# Patient Record
Sex: MALE | Race: WHITE | NOT HISPANIC OR LATINO | Employment: OTHER | URBAN - METROPOLITAN AREA
[De-identification: names, ages, dates, MRNs, and addresses within clinical notes are randomized per-mention and may not be internally consistent; named-entity substitution may affect disease eponyms.]

---

## 2018-02-23 ENCOUNTER — TRANSFERRED RECORDS (OUTPATIENT)
Dept: HEALTH INFORMATION MANAGEMENT | Facility: CLINIC | Age: 83
End: 2018-02-23

## 2023-01-01 ENCOUNTER — MEDICAL CORRESPONDENCE (OUTPATIENT)
Dept: HEALTH INFORMATION MANAGEMENT | Facility: CLINIC | Age: 88
End: 2023-01-01
Payer: OTHER MISCELLANEOUS

## 2023-01-01 ENCOUNTER — APPOINTMENT (OUTPATIENT)
Dept: GENERAL RADIOLOGY | Facility: CLINIC | Age: 88
DRG: 872 | End: 2023-01-01
Attending: EMERGENCY MEDICINE
Payer: MEDICARE

## 2023-01-01 ENCOUNTER — HOSPITAL ENCOUNTER (INPATIENT)
Facility: CLINIC | Age: 88
LOS: 4 days | End: 2023-05-05
Attending: HOSPITALIST | Admitting: INTERNAL MEDICINE
Payer: OTHER MISCELLANEOUS

## 2023-01-01 ENCOUNTER — APPOINTMENT (OUTPATIENT)
Dept: CT IMAGING | Facility: CLINIC | Age: 88
DRG: 872 | End: 2023-01-01
Attending: EMERGENCY MEDICINE
Payer: MEDICARE

## 2023-01-01 ENCOUNTER — HOSPITAL ENCOUNTER (INPATIENT)
Facility: CLINIC | Age: 88
LOS: 2 days | Discharge: HOSPICE | DRG: 872 | End: 2023-05-01
Attending: EMERGENCY MEDICINE | Admitting: HOSPITALIST
Payer: MEDICARE

## 2023-01-01 ENCOUNTER — MEDICAL CORRESPONDENCE (OUTPATIENT)
Dept: HEALTH INFORMATION MANAGEMENT | Facility: CLINIC | Age: 88
End: 2023-01-01

## 2023-01-01 VITALS
OXYGEN SATURATION: 94 % | HEART RATE: 100 BPM | BODY MASS INDEX: 25.26 KG/M2 | HEIGHT: 67 IN | SYSTOLIC BLOOD PRESSURE: 132 MMHG | WEIGHT: 160.94 LBS | TEMPERATURE: 102.1 F | DIASTOLIC BLOOD PRESSURE: 82 MMHG | RESPIRATION RATE: 16 BRPM

## 2023-01-01 VITALS — TEMPERATURE: 101.1 F | RESPIRATION RATE: 9 BRPM

## 2023-01-01 DIAGNOSIS — Z51.5 HOSPICE CARE PATIENT: ICD-10-CM

## 2023-01-01 DIAGNOSIS — A41.9 SEPSIS, DUE TO UNSPECIFIED ORGANISM, UNSPECIFIED WHETHER ACUTE ORGAN DYSFUNCTION PRESENT (H): ICD-10-CM

## 2023-01-01 DIAGNOSIS — N39.0 URINARY TRACT INFECTION WITHOUT HEMATURIA, SITE UNSPECIFIED: ICD-10-CM

## 2023-01-01 LAB
ACINETOBACTER SPECIES: NOT DETECTED
ALBUMIN SERPL BCG-MCNC: 3.6 G/DL (ref 3.5–5.2)
ALBUMIN UR-MCNC: 100 MG/DL
ALP SERPL-CCNC: 193 U/L (ref 40–129)
ALT SERPL W P-5'-P-CCNC: 19 U/L (ref 10–50)
ANION GAP SERPL CALCULATED.3IONS-SCNC: 14 MMOL/L (ref 7–15)
APPEARANCE UR: ABNORMAL
AST SERPL W P-5'-P-CCNC: 27 U/L (ref 10–50)
ATRIAL RATE - MUSE: NORMAL BPM
BACTERIA BLD CULT: ABNORMAL
BACTERIA UR CULT: ABNORMAL
BASOPHILS # BLD AUTO: 0 10E3/UL (ref 0–0.2)
BASOPHILS NFR BLD AUTO: 0 %
BILIRUB SERPL-MCNC: 0.4 MG/DL
BILIRUB UR QL STRIP: NEGATIVE
BUN SERPL-MCNC: 43.2 MG/DL (ref 8–23)
CALCIUM SERPL-MCNC: 9.1 MG/DL (ref 8.2–9.6)
CHLORIDE SERPL-SCNC: 105 MMOL/L (ref 98–107)
CITROBACTER SPECIES: NOT DETECTED
COLOR UR AUTO: ABNORMAL
CREAT SERPL-MCNC: 1.47 MG/DL (ref 0.67–1.17)
CTX-M: NOT DETECTED
DEPRECATED HCO3 PLAS-SCNC: 19 MMOL/L (ref 22–29)
DIASTOLIC BLOOD PRESSURE - MUSE: NORMAL MMHG
ENTEROBACTER SPECIES: NOT DETECTED
EOSINOPHIL # BLD AUTO: 0 10E3/UL (ref 0–0.7)
EOSINOPHIL NFR BLD AUTO: 0 %
ERYTHROCYTE [DISTWIDTH] IN BLOOD BY AUTOMATED COUNT: 15.3 % (ref 10–15)
ESCHERICHIA COLI: DETECTED
FLUAV RNA SPEC QL NAA+PROBE: NEGATIVE
FLUBV RNA RESP QL NAA+PROBE: NEGATIVE
GFR SERPL CREATININE-BSD FRML MDRD: 44 ML/MIN/1.73M2
GLUCOSE SERPL-MCNC: 132 MG/DL (ref 70–99)
GLUCOSE UR STRIP-MCNC: NEGATIVE MG/DL
HCO3 BLDV-SCNC: 20 MMOL/L (ref 21–28)
HCT VFR BLD AUTO: 31.5 % (ref 40–53)
HGB BLD-MCNC: 10.3 G/DL (ref 13.3–17.7)
HGB UR QL STRIP: ABNORMAL
HOLD SPECIMEN: NORMAL
IMM GRANULOCYTES # BLD: 0.2 10E3/UL
IMM GRANULOCYTES NFR BLD: 1 %
IMP: NOT DETECTED
INTERPRETATION ECG - MUSE: NORMAL
KETONES UR STRIP-MCNC: NEGATIVE MG/DL
KLEBSIELLA OXYTOCA: NOT DETECTED
KLEBSIELLA PNEUMONIAE: NOT DETECTED
KPC: NOT DETECTED
LACTATE BLD-SCNC: 1.3 MMOL/L
LACTATE SERPL-SCNC: 1.4 MMOL/L (ref 0.7–2)
LEUKOCYTE ESTERASE UR QL STRIP: ABNORMAL
LYMPHOCYTES # BLD AUTO: 1 10E3/UL (ref 0.8–5.3)
LYMPHOCYTES NFR BLD AUTO: 5 %
MCH RBC QN AUTO: 32 PG (ref 26.5–33)
MCHC RBC AUTO-ENTMCNC: 32.7 G/DL (ref 31.5–36.5)
MCV RBC AUTO: 98 FL (ref 78–100)
MONOCYTES # BLD AUTO: 1.2 10E3/UL (ref 0–1.3)
MONOCYTES NFR BLD AUTO: 7 %
MUCOUS THREADS #/AREA URNS LPF: PRESENT /LPF
NDM: NOT DETECTED
NEUTROPHILS # BLD AUTO: 15.3 10E3/UL (ref 1.6–8.3)
NEUTROPHILS NFR BLD AUTO: 87 %
NITRATE UR QL: POSITIVE
NRBC # BLD AUTO: 0 10E3/UL
NRBC BLD AUTO-RTO: 0 /100
OXA (DETECTED/NOT DETECTED): NOT DETECTED
P AXIS - MUSE: NORMAL DEGREES
PCO2 BLDV: 37 MM HG (ref 40–50)
PH BLDV: 7.35 [PH] (ref 7.32–7.43)
PH UR STRIP: 5.5 [PH] (ref 5–7)
PLATELET # BLD AUTO: 228 10E3/UL (ref 150–450)
PO2 BLDV: 30 MM HG (ref 25–47)
POTASSIUM SERPL-SCNC: 4.5 MMOL/L (ref 3.4–5.3)
PR INTERVAL - MUSE: NORMAL MS
PROT SERPL-MCNC: 7.7 G/DL (ref 6.4–8.3)
PROTEUS SPECIES: NOT DETECTED
PSEUDOMONAS AERUGINOSA: NOT DETECTED
QRS DURATION - MUSE: 78 MS
QT - MUSE: 320 MS
QTC - MUSE: 412 MS
R AXIS - MUSE: 56 DEGREES
RBC # BLD AUTO: 3.22 10E6/UL (ref 4.4–5.9)
RBC URINE: 4 /HPF
RSV RNA SPEC NAA+PROBE: NEGATIVE
SAO2 % BLDV: 54 % (ref 94–100)
SARS-COV-2 RNA RESP QL NAA+PROBE: NEGATIVE
SODIUM SERPL-SCNC: 138 MMOL/L (ref 136–145)
SP GR UR STRIP: 1.01 (ref 1–1.03)
SYSTOLIC BLOOD PRESSURE - MUSE: NORMAL MMHG
T AXIS - MUSE: 70 DEGREES
TROPONIN T SERPL HS-MCNC: 77 NG/L
UROBILINOGEN UR STRIP-MCNC: NORMAL MG/DL
VENTRICULAR RATE- MUSE: 100 BPM
VIM: NOT DETECTED
WBC # BLD AUTO: 17.6 10E3/UL (ref 4–11)
WBC CLUMPS #/AREA URNS HPF: PRESENT /HPF
WBC URINE: >182 /HPF

## 2023-01-01 PROCEDURE — 99232 SBSQ HOSP IP/OBS MODERATE 35: CPT | Mod: GV | Performed by: INTERNAL MEDICINE

## 2023-01-01 PROCEDURE — 250N000013 HC RX MED GY IP 250 OP 250 PS 637: Performed by: INTERNAL MEDICINE

## 2023-01-01 PROCEDURE — 110N000005 HC R&B HOSPICE, ACCENT

## 2023-01-01 PROCEDURE — 83605 ASSAY OF LACTIC ACID: CPT | Performed by: EMERGENCY MEDICINE

## 2023-01-01 PROCEDURE — 81001 URINALYSIS AUTO W/SCOPE: CPT | Performed by: EMERGENCY MEDICINE

## 2023-01-01 PROCEDURE — 80053 COMPREHEN METABOLIC PANEL: CPT | Performed by: EMERGENCY MEDICINE

## 2023-01-01 PROCEDURE — 250N000009 HC RX 250: Performed by: INTERNAL MEDICINE

## 2023-01-01 PROCEDURE — 250N000011 HC RX IP 250 OP 636: Performed by: HOSPITALIST

## 2023-01-01 PROCEDURE — 99285 EMERGENCY DEPT VISIT HI MDM: CPT | Mod: 25,CS

## 2023-01-01 PROCEDURE — 87086 URINE CULTURE/COLONY COUNT: CPT | Performed by: EMERGENCY MEDICINE

## 2023-01-01 PROCEDURE — 250N000011 HC RX IP 250 OP 636: Performed by: INTERNAL MEDICINE

## 2023-01-01 PROCEDURE — 120N000001 HC R&B MED SURG/OB

## 2023-01-01 PROCEDURE — C9803 HOPD COVID-19 SPEC COLLECT: HCPCS

## 2023-01-01 PROCEDURE — 70450 CT HEAD/BRAIN W/O DYE: CPT

## 2023-01-01 PROCEDURE — 82803 BLOOD GASES ANY COMBINATION: CPT

## 2023-01-01 PROCEDURE — 85025 COMPLETE CBC W/AUTO DIFF WBC: CPT | Performed by: EMERGENCY MEDICINE

## 2023-01-01 PROCEDURE — 250N000013 HC RX MED GY IP 250 OP 250 PS 637: Performed by: HOSPITALIST

## 2023-01-01 PROCEDURE — 36415 COLL VENOUS BLD VENIPUNCTURE: CPT | Performed by: EMERGENCY MEDICINE

## 2023-01-01 PROCEDURE — 96375 TX/PRO/DX INJ NEW DRUG ADDON: CPT

## 2023-01-01 PROCEDURE — 87637 SARSCOV2&INF A&B&RSV AMP PRB: CPT | Performed by: EMERGENCY MEDICINE

## 2023-01-01 PROCEDURE — 71046 X-RAY EXAM CHEST 2 VIEWS: CPT

## 2023-01-01 PROCEDURE — 96365 THER/PROPH/DIAG IV INF INIT: CPT

## 2023-01-01 PROCEDURE — 99207 PR APP CREDIT; MD BILLING SHARED VISIT: CPT | Mod: GV | Performed by: INTERNAL MEDICINE

## 2023-01-01 PROCEDURE — 258N000003 HC RX IP 258 OP 636: Performed by: EMERGENCY MEDICINE

## 2023-01-01 PROCEDURE — 250N000011 HC RX IP 250 OP 636: Performed by: EMERGENCY MEDICINE

## 2023-01-01 PROCEDURE — 99239 HOSP IP/OBS DSCHRG MGMT >30: CPT | Mod: GV | Performed by: INTERNAL MEDICINE

## 2023-01-01 PROCEDURE — 83605 ASSAY OF LACTIC ACID: CPT

## 2023-01-01 PROCEDURE — G0378 HOSPITAL OBSERVATION PER HR: HCPCS

## 2023-01-01 PROCEDURE — 84484 ASSAY OF TROPONIN QUANT: CPT | Performed by: EMERGENCY MEDICINE

## 2023-01-01 PROCEDURE — 99222 1ST HOSP IP/OBS MODERATE 55: CPT | Performed by: HOSPITALIST

## 2023-01-01 PROCEDURE — 250N000013 HC RX MED GY IP 250 OP 250 PS 637: Performed by: EMERGENCY MEDICINE

## 2023-01-01 PROCEDURE — 93005 ELECTROCARDIOGRAM TRACING: CPT | Mod: 76

## 2023-01-01 PROCEDURE — 87077 CULTURE AEROBIC IDENTIFY: CPT | Performed by: EMERGENCY MEDICINE

## 2023-01-01 PROCEDURE — 99231 SBSQ HOSP IP/OBS SF/LOW 25: CPT | Mod: GV | Performed by: INTERNAL MEDICINE

## 2023-01-01 PROCEDURE — 99238 HOSP IP/OBS DSCHRG MGMT 30/<: CPT | Mod: GV | Performed by: NURSE PRACTITIONER

## 2023-01-01 PROCEDURE — 87149 DNA/RNA DIRECT PROBE: CPT | Performed by: EMERGENCY MEDICINE

## 2023-01-01 PROCEDURE — 99207 PR NO BILLABLE SERVICE THIS VISIT: CPT | Mod: GV | Performed by: INTERNAL MEDICINE

## 2023-01-01 PROCEDURE — 96361 HYDRATE IV INFUSION ADD-ON: CPT

## 2023-01-01 PROCEDURE — 99232 SBSQ HOSP IP/OBS MODERATE 35: CPT | Performed by: INTERNAL MEDICINE

## 2023-01-01 RX ORDER — MORPHINE SULFATE 20 MG/ML
10 SOLUTION ORAL
Status: CANCELLED | OUTPATIENT
Start: 2023-01-01

## 2023-01-01 RX ORDER — MORPHINE SULFATE 2 MG/ML
1 INJECTION, SOLUTION INTRAMUSCULAR; INTRAVENOUS
Status: DISCONTINUED | OUTPATIENT
Start: 2023-01-01 | End: 2023-01-01 | Stop reason: HOSPADM

## 2023-01-01 RX ORDER — ACETAMINOPHEN 325 MG/1
650 TABLET ORAL EVERY 6 HOURS PRN
Status: DISCONTINUED | OUTPATIENT
Start: 2023-01-01 | End: 2023-01-01 | Stop reason: HOSPADM

## 2023-01-01 RX ORDER — CEFTRIAXONE 2 G/1
2 INJECTION, POWDER, FOR SOLUTION INTRAMUSCULAR; INTRAVENOUS ONCE
Status: COMPLETED | OUTPATIENT
Start: 2023-01-01 | End: 2023-01-01

## 2023-01-01 RX ORDER — NALOXONE HYDROCHLORIDE 0.4 MG/ML
0.1 INJECTION, SOLUTION INTRAMUSCULAR; INTRAVENOUS; SUBCUTANEOUS
Status: DISCONTINUED | OUTPATIENT
Start: 2023-01-01 | End: 2023-01-01 | Stop reason: HOSPADM

## 2023-01-01 RX ORDER — ACETAMINOPHEN 325 MG/1
650 TABLET ORAL EVERY 6 HOURS PRN
Status: CANCELLED | OUTPATIENT
Start: 2023-01-01

## 2023-01-01 RX ORDER — HALOPERIDOL 5 MG/ML
1 INJECTION INTRAMUSCULAR
Status: DISCONTINUED | OUTPATIENT
Start: 2023-01-01 | End: 2023-01-01 | Stop reason: HOSPADM

## 2023-01-01 RX ORDER — MORPHINE SULFATE 2 MG/ML
1 INJECTION, SOLUTION INTRAMUSCULAR; INTRAVENOUS
Status: CANCELLED | OUTPATIENT
Start: 2023-01-01

## 2023-01-01 RX ORDER — MORPHINE SULFATE 2 MG/ML
2 INJECTION, SOLUTION INTRAMUSCULAR; INTRAVENOUS
Status: DISCONTINUED | OUTPATIENT
Start: 2023-01-01 | End: 2023-01-01 | Stop reason: HOSPADM

## 2023-01-01 RX ORDER — NALOXONE HYDROCHLORIDE 0.4 MG/ML
0.1 INJECTION, SOLUTION INTRAMUSCULAR; INTRAVENOUS; SUBCUTANEOUS
Status: CANCELLED | OUTPATIENT
Start: 2023-01-01

## 2023-01-01 RX ORDER — GLYCOPYRROLATE 0.2 MG/ML
0.2 INJECTION, SOLUTION INTRAMUSCULAR; INTRAVENOUS EVERY 4 HOURS PRN
Status: DISCONTINUED | OUTPATIENT
Start: 2023-01-01 | End: 2023-01-01 | Stop reason: HOSPADM

## 2023-01-01 RX ORDER — NALOXONE HYDROCHLORIDE 0.4 MG/ML
0.2 INJECTION, SOLUTION INTRAMUSCULAR; INTRAVENOUS; SUBCUTANEOUS
Status: CANCELLED | OUTPATIENT
Start: 2023-01-01

## 2023-01-01 RX ORDER — ONDANSETRON 4 MG/1
4 TABLET, ORALLY DISINTEGRATING ORAL EVERY 6 HOURS PRN
Status: DISCONTINUED | OUTPATIENT
Start: 2023-01-01 | End: 2023-01-01 | Stop reason: HOSPADM

## 2023-01-01 RX ORDER — ONDANSETRON 4 MG/1
4 TABLET, ORALLY DISINTEGRATING ORAL EVERY 6 HOURS PRN
Status: CANCELLED | OUTPATIENT
Start: 2023-01-01

## 2023-01-01 RX ORDER — MORPHINE SULFATE 10 MG/5ML
5 SOLUTION ORAL
Status: DISCONTINUED | OUTPATIENT
Start: 2023-01-01 | End: 2023-01-01 | Stop reason: HOSPADM

## 2023-01-01 RX ORDER — MORPHINE SULFATE 20 MG/ML
5 SOLUTION ORAL
Status: DISCONTINUED | OUTPATIENT
Start: 2023-01-01 | End: 2023-01-01 | Stop reason: HOSPADM

## 2023-01-01 RX ORDER — PANTOPRAZOLE SODIUM 40 MG/1
1 TABLET, DELAYED RELEASE ORAL DAILY PRN
COMMUNITY

## 2023-01-01 RX ORDER — ACETAMINOPHEN 650 MG/1
650 SUPPOSITORY RECTAL EVERY 6 HOURS PRN
Status: DISCONTINUED | OUTPATIENT
Start: 2023-01-01 | End: 2023-01-01

## 2023-01-01 RX ORDER — PROCHLORPERAZINE MALEATE 5 MG
5 TABLET ORAL EVERY 6 HOURS PRN
Status: CANCELLED | OUTPATIENT
Start: 2023-01-01

## 2023-01-01 RX ORDER — NALOXONE HYDROCHLORIDE 0.4 MG/ML
0.2 INJECTION, SOLUTION INTRAMUSCULAR; INTRAVENOUS; SUBCUTANEOUS
Status: DISCONTINUED | OUTPATIENT
Start: 2023-01-01 | End: 2023-01-01 | Stop reason: HOSPADM

## 2023-01-01 RX ORDER — MORPHINE SULFATE 20 MG/ML
10 SOLUTION ORAL
Status: DISCONTINUED | OUTPATIENT
Start: 2023-01-01 | End: 2023-01-01 | Stop reason: HOSPADM

## 2023-01-01 RX ORDER — GABAPENTIN 300 MG/1
300 CAPSULE ORAL 3 TIMES DAILY
COMMUNITY

## 2023-01-01 RX ORDER — HALOPERIDOL 5 MG/ML
1 INJECTION INTRAMUSCULAR EVERY 6 HOURS
Status: CANCELLED | OUTPATIENT
Start: 2023-01-01

## 2023-01-01 RX ORDER — LORAZEPAM 1 MG/1
1 TABLET ORAL
Status: CANCELLED | OUTPATIENT
Start: 2023-01-01

## 2023-01-01 RX ORDER — ONDANSETRON 2 MG/ML
4 INJECTION INTRAMUSCULAR; INTRAVENOUS EVERY 6 HOURS PRN
Status: DISCONTINUED | OUTPATIENT
Start: 2023-01-01 | End: 2023-01-01 | Stop reason: HOSPADM

## 2023-01-01 RX ORDER — AMLODIPINE BESYLATE 10 MG/1
10 TABLET ORAL DAILY
COMMUNITY

## 2023-01-01 RX ORDER — CEFTRIAXONE 1 G/1
1 INJECTION, POWDER, FOR SOLUTION INTRAMUSCULAR; INTRAVENOUS ONCE
Status: DISCONTINUED | OUTPATIENT
Start: 2023-01-01 | End: 2023-01-01

## 2023-01-01 RX ORDER — ACETAMINOPHEN 500 MG
1000 TABLET ORAL ONCE
Status: DISCONTINUED | OUTPATIENT
Start: 2023-01-01 | End: 2023-01-01

## 2023-01-01 RX ORDER — LORAZEPAM 1 MG/1
1 TABLET ORAL
Status: DISCONTINUED | OUTPATIENT
Start: 2023-01-01 | End: 2023-01-01 | Stop reason: HOSPADM

## 2023-01-01 RX ORDER — LORAZEPAM 2 MG/ML
0.5 INJECTION INTRAMUSCULAR ONCE
Status: COMPLETED | OUTPATIENT
Start: 2023-01-01 | End: 2023-01-01

## 2023-01-01 RX ORDER — ACETAMINOPHEN 325 MG/1
650 TABLET ORAL EVERY 6 HOURS
COMMUNITY

## 2023-01-01 RX ORDER — MORPHINE SULFATE 10 MG/5ML
5 SOLUTION ORAL
Status: CANCELLED | OUTPATIENT
Start: 2023-01-01

## 2023-01-01 RX ORDER — MORPHINE SULFATE 20 MG/ML
5 SOLUTION ORAL
Status: CANCELLED | OUTPATIENT
Start: 2023-01-01

## 2023-01-01 RX ORDER — ACETAMINOPHEN 650 MG/1
650 SUPPOSITORY RECTAL EVERY 6 HOURS PRN
Status: DISCONTINUED | OUTPATIENT
Start: 2023-01-01 | End: 2023-01-01 | Stop reason: HOSPADM

## 2023-01-01 RX ORDER — HALOPERIDOL 5 MG/ML
1 INJECTION INTRAMUSCULAR
Status: CANCELLED | OUTPATIENT
Start: 2023-01-01

## 2023-01-01 RX ORDER — MORPHINE SULFATE 10 MG/5ML
10 SOLUTION ORAL
Status: DISCONTINUED | OUTPATIENT
Start: 2023-01-01 | End: 2023-01-01 | Stop reason: HOSPADM

## 2023-01-01 RX ORDER — LIDOCAINE 40 MG/G
CREAM TOPICAL
Status: CANCELLED | OUTPATIENT
Start: 2023-01-01

## 2023-01-01 RX ORDER — ATROPINE SULFATE 10 MG/ML
2 SOLUTION/ DROPS OPHTHALMIC EVERY 4 HOURS PRN
Status: CANCELLED | OUTPATIENT
Start: 2023-01-01

## 2023-01-01 RX ORDER — MORPHINE SULFATE 30 MG/1
5 TABLET ORAL EVERY 4 HOURS PRN
COMMUNITY

## 2023-01-01 RX ORDER — GLYCOPYRROLATE 0.2 MG/ML
0.2 INJECTION, SOLUTION INTRAMUSCULAR; INTRAVENOUS EVERY 4 HOURS PRN
Status: CANCELLED | OUTPATIENT
Start: 2023-01-01

## 2023-01-01 RX ORDER — AMOXICILLIN 250 MG
1 CAPSULE ORAL 2 TIMES DAILY PRN
COMMUNITY

## 2023-01-01 RX ORDER — SODIUM CHLORIDE 9 MG/ML
INJECTION, SOLUTION INTRAVENOUS CONTINUOUS
Status: DISCONTINUED | OUTPATIENT
Start: 2023-01-01 | End: 2023-01-01

## 2023-01-01 RX ORDER — LORAZEPAM 0.5 MG/1
0.5 TABLET ORAL EVERY 4 HOURS PRN
COMMUNITY

## 2023-01-01 RX ORDER — MORPHINE SULFATE 10 MG/5ML
10 SOLUTION ORAL
Status: CANCELLED | OUTPATIENT
Start: 2023-01-01

## 2023-01-01 RX ORDER — LIDOCAINE 40 MG/G
CREAM TOPICAL
Status: DISCONTINUED | OUTPATIENT
Start: 2023-01-01 | End: 2023-01-01 | Stop reason: HOSPADM

## 2023-01-01 RX ORDER — PROCHLORPERAZINE 25 MG
12.5 SUPPOSITORY, RECTAL RECTAL EVERY 12 HOURS PRN
Status: DISCONTINUED | OUTPATIENT
Start: 2023-01-01 | End: 2023-01-01 | Stop reason: HOSPADM

## 2023-01-01 RX ORDER — HALOPERIDOL 5 MG/ML
1 INJECTION INTRAMUSCULAR EVERY 6 HOURS
Status: DISCONTINUED | OUTPATIENT
Start: 2023-01-01 | End: 2023-01-01

## 2023-01-01 RX ORDER — LORAZEPAM 2 MG/ML
1 INJECTION INTRAMUSCULAR
Status: DISCONTINUED | OUTPATIENT
Start: 2023-01-01 | End: 2023-01-01 | Stop reason: HOSPADM

## 2023-01-01 RX ORDER — PROCHLORPERAZINE MALEATE 5 MG
5 TABLET ORAL EVERY 6 HOURS PRN
Status: DISCONTINUED | OUTPATIENT
Start: 2023-01-01 | End: 2023-01-01 | Stop reason: HOSPADM

## 2023-01-01 RX ORDER — BISACODYL 10 MG
10 SUPPOSITORY, RECTAL RECTAL DAILY PRN
Status: DISCONTINUED | OUTPATIENT
Start: 2023-01-01 | End: 2023-01-01 | Stop reason: HOSPADM

## 2023-01-01 RX ORDER — UREA 200 MG/G
CREAM TOPICAL DAILY
COMMUNITY

## 2023-01-01 RX ORDER — SODIUM PHOSPHATE,MONO-DIBASIC 19G-7G/118
1 ENEMA (ML) RECTAL DAILY
COMMUNITY

## 2023-01-01 RX ORDER — ACETAMINOPHEN 650 MG/1
650 SUPPOSITORY RECTAL EVERY 6 HOURS PRN
Status: CANCELLED | OUTPATIENT
Start: 2023-01-01

## 2023-01-01 RX ORDER — ONDANSETRON 2 MG/ML
4 INJECTION INTRAMUSCULAR; INTRAVENOUS EVERY 6 HOURS PRN
Status: CANCELLED | OUTPATIENT
Start: 2023-01-01

## 2023-01-01 RX ORDER — TAMSULOSIN HYDROCHLORIDE 0.4 MG/1
0.8 CAPSULE ORAL DAILY
COMMUNITY

## 2023-01-01 RX ORDER — ASPIRIN 81 MG/1
81 TABLET, CHEWABLE ORAL DAILY
COMMUNITY

## 2023-01-01 RX ORDER — LEVOTHYROXINE SODIUM 75 UG/1
75 TABLET ORAL DAILY
COMMUNITY

## 2023-01-01 RX ORDER — PROCHLORPERAZINE 25 MG
12.5 SUPPOSITORY, RECTAL RECTAL EVERY 12 HOURS PRN
Status: CANCELLED | OUTPATIENT
Start: 2023-01-01

## 2023-01-01 RX ORDER — LOPERAMIDE HYDROCHLORIDE 2 MG/1
2 TABLET ORAL 4 TIMES DAILY PRN
COMMUNITY

## 2023-01-01 RX ORDER — HALOPERIDOL 1 MG/1
1 TABLET ORAL EVERY 4 HOURS PRN
COMMUNITY

## 2023-01-01 RX ORDER — LORAZEPAM 2 MG/ML
1 INJECTION INTRAMUSCULAR
Status: CANCELLED | OUTPATIENT
Start: 2023-01-01

## 2023-01-01 RX ORDER — ATROPINE SULFATE 10 MG/ML
2 SOLUTION/ DROPS OPHTHALMIC EVERY 4 HOURS PRN
Status: DISCONTINUED | OUTPATIENT
Start: 2023-01-01 | End: 2023-01-01 | Stop reason: HOSPADM

## 2023-01-01 RX ORDER — ACETAMINOPHEN 650 MG/1
650 SUPPOSITORY RECTAL ONCE
Status: COMPLETED | OUTPATIENT
Start: 2023-01-01 | End: 2023-01-01

## 2023-01-01 RX ORDER — MORPHINE SULFATE 2 MG/ML
2 INJECTION, SOLUTION INTRAMUSCULAR; INTRAVENOUS
Status: CANCELLED | OUTPATIENT
Start: 2023-01-01

## 2023-01-01 RX ORDER — PIPERACILLIN SODIUM, TAZOBACTAM SODIUM 4; .5 G/20ML; G/20ML
4.5 INJECTION, POWDER, LYOPHILIZED, FOR SOLUTION INTRAVENOUS ONCE
Status: DISCONTINUED | OUTPATIENT
Start: 2023-01-01 | End: 2023-01-01

## 2023-01-01 RX ADMIN — MORPHINE SULFATE 5 MG: 100 SOLUTION ORAL at 11:48

## 2023-01-01 RX ADMIN — MORPHINE SULFATE 10 MG: 10 SOLUTION ORAL at 16:44

## 2023-01-01 RX ADMIN — MORPHINE SULFATE 10 MG: 10 SOLUTION ORAL at 12:07

## 2023-01-01 RX ADMIN — MORPHINE SULFATE 5 MG: 100 SOLUTION ORAL at 08:44

## 2023-01-01 RX ADMIN — SODIUM CHLORIDE 1000 ML: 9 INJECTION, SOLUTION INTRAVENOUS at 08:48

## 2023-01-01 RX ADMIN — HALOPERIDOL LACTATE 1 MG: 5 INJECTION, SOLUTION INTRAMUSCULAR at 15:36

## 2023-01-01 RX ADMIN — MORPHINE SULFATE 10 MG: 100 SOLUTION ORAL at 07:45

## 2023-01-01 RX ADMIN — MORPHINE SULFATE 1 MG: 2 INJECTION, SOLUTION INTRAMUSCULAR; INTRAVENOUS at 15:20

## 2023-01-01 RX ADMIN — HALOPERIDOL LACTATE 1 MG: 5 INJECTION, SOLUTION INTRAMUSCULAR at 15:31

## 2023-01-01 RX ADMIN — MORPHINE SULFATE 2 MG: 2 INJECTION, SOLUTION INTRAMUSCULAR; INTRAVENOUS at 22:41

## 2023-01-01 RX ADMIN — LORAZEPAM 1 MG: 2 INJECTION INTRAMUSCULAR; INTRAVENOUS at 07:44

## 2023-01-01 RX ADMIN — BISACODYL 10 MG: 10 SUPPOSITORY RECTAL at 23:32

## 2023-01-01 RX ADMIN — HALOPERIDOL LACTATE 1 MG: 5 INJECTION, SOLUTION INTRAMUSCULAR at 18:09

## 2023-01-01 RX ADMIN — HALOPERIDOL LACTATE 1 MG: 5 INJECTION, SOLUTION INTRAMUSCULAR at 12:06

## 2023-01-01 RX ADMIN — LORAZEPAM 1 MG: 1 TABLET ORAL at 14:51

## 2023-01-01 RX ADMIN — MORPHINE SULFATE 10 MG: 100 SOLUTION ORAL at 16:35

## 2023-01-01 RX ADMIN — LORAZEPAM 1 MG: 2 INJECTION INTRAMUSCULAR at 16:23

## 2023-01-01 RX ADMIN — LORAZEPAM 1 MG: 1 TABLET ORAL at 21:27

## 2023-01-01 RX ADMIN — MORPHINE SULFATE 2 MG: 2 INJECTION, SOLUTION INTRAMUSCULAR; INTRAVENOUS at 01:49

## 2023-01-01 RX ADMIN — MORPHINE SULFATE 10 MG: 100 SOLUTION ORAL at 12:35

## 2023-01-01 RX ADMIN — MORPHINE SULFATE 10 MG: 10 SOLUTION ORAL at 18:24

## 2023-01-01 RX ADMIN — LORAZEPAM 1 MG: 2 INJECTION INTRAMUSCULAR; INTRAVENOUS at 13:03

## 2023-01-01 RX ADMIN — VANCOMYCIN HYDROCHLORIDE 1750 MG: 10 INJECTION, POWDER, LYOPHILIZED, FOR SOLUTION INTRAVENOUS at 10:22

## 2023-01-01 RX ADMIN — CEFTRIAXONE SODIUM 2 G: 2 INJECTION, POWDER, FOR SOLUTION INTRAMUSCULAR; INTRAVENOUS at 09:40

## 2023-01-01 RX ADMIN — LORAZEPAM 1 MG: 2 INJECTION INTRAMUSCULAR; INTRAVENOUS at 19:47

## 2023-01-01 RX ADMIN — LORAZEPAM 1 MG: 2 INJECTION INTRAMUSCULAR at 13:16

## 2023-01-01 RX ADMIN — ACETAMINOPHEN 650 MG: 650 SUPPOSITORY RECTAL at 09:40

## 2023-01-01 RX ADMIN — MORPHINE SULFATE 1 MG: 2 INJECTION, SOLUTION INTRAMUSCULAR; INTRAVENOUS at 21:26

## 2023-01-01 RX ADMIN — GLYCOPYRROLATE 0.2 MG: 0.2 INJECTION, SOLUTION INTRAMUSCULAR; INTRAVENOUS at 01:43

## 2023-01-01 RX ADMIN — MORPHINE SULFATE 10 MG: 100 SOLUTION ORAL at 13:06

## 2023-01-01 RX ADMIN — LORAZEPAM 1 MG: 2 INJECTION INTRAMUSCULAR at 18:22

## 2023-01-01 RX ADMIN — MORPHINE SULFATE 2 MG: 2 INJECTION, SOLUTION INTRAMUSCULAR; INTRAVENOUS at 18:22

## 2023-01-01 RX ADMIN — LORAZEPAM 0.5 MG: 2 INJECTION INTRAMUSCULAR at 14:21

## 2023-01-01 RX ADMIN — LORAZEPAM 1 MG: 1 TABLET ORAL at 08:21

## 2023-01-01 RX ADMIN — MORPHINE SULFATE 10 MG: 100 SOLUTION ORAL at 15:37

## 2023-01-01 RX ADMIN — MORPHINE SULFATE 10 MG: 100 SOLUTION ORAL at 11:00

## 2023-01-01 RX ADMIN — MORPHINE SULFATE 2 MG: 2 INJECTION, SOLUTION INTRAMUSCULAR; INTRAVENOUS at 08:12

## 2023-01-01 RX ADMIN — LORAZEPAM 1 MG: 2 INJECTION INTRAMUSCULAR; INTRAVENOUS at 16:46

## 2023-01-01 RX ADMIN — MORPHINE SULFATE 10 MG: 100 SOLUTION ORAL at 14:01

## 2023-01-01 RX ADMIN — LORAZEPAM 1 MG: 2 INJECTION INTRAMUSCULAR; INTRAVENOUS at 07:47

## 2023-01-01 RX ADMIN — MORPHINE SULFATE 10 MG: 10 SOLUTION ORAL at 14:09

## 2023-01-01 RX ADMIN — BISACODYL 10 MG: 10 SUPPOSITORY RECTAL at 18:34

## 2023-01-01 RX ADMIN — HALOPERIDOL LACTATE 1 MG: 5 INJECTION, SOLUTION INTRAMUSCULAR at 23:33

## 2023-01-01 ASSESSMENT — ACTIVITIES OF DAILY LIVING (ADL)
ADLS_ACUITY_SCORE: 35
COMMUNICATION: UNABLE TO UNDERSTAND
ADLS_ACUITY_SCORE: 69
TOILETING_ISSUES: YES
ADLS_ACUITY_SCORE: 49
ADLS_ACUITY_SCORE: 37
DIFFICULTY_EATING/SWALLOWING: NO
ADLS_ACUITY_SCORE: 71
ADLS_ACUITY_SCORE: 69
TRANSFERRING: 2-->COMPLETELY DEPENDENT
ADLS_ACUITY_SCORE: 45
HEARING_DIFFICULTY_OR_DEAF: YES
ADLS_ACUITY_SCORE: 45
ADLS_ACUITY_SCORE: 35
ADLS_ACUITY_SCORE: 45
ADLS_ACUITY_SCORE: 45
ADLS_ACUITY_SCORE: 37
USE_OF_HEARING_ASSISTIVE_DEVICES: BILATERAL HEARING AIDS
ADLS_ACUITY_SCORE: 35
ADLS_ACUITY_SCORE: 45
ADLS_ACUITY_SCORE: 69
ADLS_ACUITY_SCORE: 65
PATIENT'S_PREFERRED_MEANS_OF_COMMUNICATION: ENGLISH SPEAKER WITH HEARING LOSS, NO SPEECH PROBLEMS.
ADLS_ACUITY_SCORE: 35
ADLS_ACUITY_SCORE: 69
ADLS_ACUITY_SCORE: 45
TOILETING_ASSISTANCE: TOILETING DIFFICULTY, ASSISTANCE 1 PERSON
ADLS_ACUITY_SCORE: 45
ADLS_ACUITY_SCORE: 35
ADLS_ACUITY_SCORE: 71
CONCENTRATING,_REMEMBERING_OR_MAKING_DECISIONS_DIFFICULTY: YES
WERE_AUXILIARY_AIDS_OFFERED?: YES
ADLS_ACUITY_SCORE: 67
ADLS_ACUITY_SCORE: 69
ADLS_ACUITY_SCORE: 73
WALKING_OR_CLIMBING_STAIRS_DIFFICULTY: YES
ADLS_ACUITY_SCORE: 73
ADLS_ACUITY_SCORE: 45
ADLS_ACUITY_SCORE: 35
ADLS_ACUITY_SCORE: 49
ADLS_ACUITY_SCORE: 73
CHANGE_IN_FUNCTIONAL_STATUS_SINCE_ONSET_OF_CURRENT_ILLNESS/INJURY: YES
DRESS: 2-->COMPLETELY DEPENDENT (NOT DEVELOPMENTALLY APPROPRIATE)
EQUIPMENT_CURRENTLY_USED_AT_HOME: OTHER (SEE COMMENTS)
ADLS_ACUITY_SCORE: 69
ADLS_ACUITY_SCORE: 35
ADLS_ACUITY_SCORE: 69
DESCRIBE_HEARING_LOSS: BILATERAL HEARING LOSS
ADLS_ACUITY_SCORE: 71
THE_FOLLOWING_AIDS_WERE_PROVIDED;: PATIENT DECLINED OFFER OF AUXILIARY AIDS
ADLS_ACUITY_SCORE: 35
ADLS_ACUITY_SCORE: 35
ADLS_ACUITY_SCORE: 67
TOILETING: 2-->COMPLETELY DEPENDENT
ADLS_ACUITY_SCORE: 71
ADLS_ACUITY_SCORE: 35
ADLS_ACUITY_SCORE: 65
ADLS_ACUITY_SCORE: 69
ADLS_ACUITY_SCORE: 35
DRESSING/BATHING: BATHING DIFFICULTY, REQUIRES EQUIPMENT;BATHING DIFFICULTY, ASSISTANCE 1 PERSON;BATHING DIFFICULTY, DEPENDENT
ADLS_ACUITY_SCORE: 65
ADLS_ACUITY_SCORE: 45
ADLS_ACUITY_SCORE: 35
ADLS_ACUITY_SCORE: 37
DOING_ERRANDS_INDEPENDENTLY_DIFFICULTY: YES
ADLS_ACUITY_SCORE: 45
WALKING_OR_CLIMBING_STAIRS: AMBULATION DIFFICULTY, REQUIRES EQUIPMENT;AMBULATION DIFFICULTY, ASSISTANCE 1 PERSON;AMBULATION DIFFICULTY, DEPENDENT
ADLS_ACUITY_SCORE: 35
TRANSFERRING: 2-->COMPLETELY DEPENDENT (NOT DEVELOPMENTALLY APPROPRIATE)
ADLS_ACUITY_SCORE: 45
ADLS_ACUITY_SCORE: 71
ADLS_ACUITY_SCORE: 69
DIFFICULTY_COMMUNICATING: YES
ADLS_ACUITY_SCORE: 69
ADLS_ACUITY_SCORE: 49
ADLS_ACUITY_SCORE: 37
ADLS_ACUITY_SCORE: 45
DRESS: 2-->COMPLETELY DEPENDENT
ADLS_ACUITY_SCORE: 35
DRESSING/BATHING_DIFFICULTY: YES
ADLS_ACUITY_SCORE: 71
ADLS_ACUITY_SCORE: 45
NUMBER_OF_TIMES_PATIENT_HAS_FALLEN_WITHIN_LAST_SIX_MONTHS: 5
ADLS_ACUITY_SCORE: 71
ADLS_ACUITY_SCORE: 69
WEAR_GLASSES_OR_BLIND: YES
ADLS_ACUITY_SCORE: 71
ADLS_ACUITY_SCORE: 71
ADLS_ACUITY_SCORE: 35
FALL_HISTORY_WITHIN_LAST_SIX_MONTHS: YES
ADLS_ACUITY_SCORE: 69
ADLS_ACUITY_SCORE: 45
ADLS_ACUITY_SCORE: 71
ADLS_ACUITY_SCORE: 49
ADLS_ACUITY_SCORE: 35
ADLS_ACUITY_SCORE: 35
ADLS_ACUITY_SCORE: 37
ADLS_ACUITY_SCORE: 71
BATHING: 2-->COMPLETELY DEPENDENT (NOT DEVELOPMENTALLY APPROPRIATE)
TOILETING: 2-->COMPLETELY DEPENDENT (NOT DEVELOPMENTALLY APPROPRIATE)
ADLS_ACUITY_SCORE: 65
ADLS_ACUITY_SCORE: 35

## 2023-04-29 PROBLEM — N39.0 URINARY TRACT INFECTION WITHOUT HEMATURIA, SITE UNSPECIFIED: Status: ACTIVE | Noted: 2023-01-01

## 2023-04-29 PROBLEM — A41.9 SEPSIS, DUE TO UNSPECIFIED ORGANISM, UNSPECIFIED WHETHER ACUTE ORGAN DYSFUNCTION PRESENT (H): Status: ACTIVE | Noted: 2023-01-01

## 2023-04-29 PROBLEM — Z51.5 HOSPICE CARE PATIENT: Status: ACTIVE | Noted: 2023-01-01

## 2023-04-29 NOTE — H&P
[facility]    History and Physical - Hospitalist Service       Date of Admission:  4/29/2023    Assessment & Plan      Artem Rivera is a 89 year old male admitted on 4/29/2023. He was sent into the ER for evaluation of altered mental status.  He was found to be septic due to a UTI and was started on IV fluids and IV antibiotics.  It was subsequently discovered that he was actually on hospice at his assisted living facility.  Plan of care was discussed with his facility and multiple family members.  Decision was made to admit the patient to the hospital for hospice/end-of-life cares.    End-of-life cares    Comfort care orders entered.    As needed medications available.    Consult [facility] hospice.    Discussed with family that the exact timing of when he will pass away is unclear.    Sepsis  UTI  Elevated troponin  Received IV fluids and doses of Rocephin and vancomycin in the ER.    Antibiotics and IV fluids discontinued, plan for comfort cares as noted above.  No further testing/work-up planned.    Dementia  Hypertension  Hyperlipidemia  Coronary artery disease  Hypothyroidism  History of pulmonary embolism  History of subdural hematoma    PTA medications discontinued.  Not currently on alert enough to take any medications.  If he does become more alert, could consider restarting PTA gabapentin to assist with pain control.        Diet:   regular as tolerated  DVT Prophylaxis: none, comfort cares  Buckley Catheter: PRESENT, indication:    Lines: None     Cardiac Monitoring: None  Code Status:   DNR/DNI, comfort cares    Clinically Significant Risk Factors Present on Admission                               Disposition Plan      Expected Discharge Date: 05/01/2023                  Des Cazares MD  Hospitalist Service  [facility]  Securely message with [employer] (more info)  Text page via [employer] Paging/Directory      ______________________________________________________________________    Chief Complaint   Altered mental status    History is obtained from the patient's family    History of Present Illness   Artem Rivera is a 89 year old male who was sent into the ER for evaluation of altered mental status.  He lives in assisted living with his wife.  He was noted to be more tired, confused, and weak today.  He was sent into the ER for evaluation.  Work-up was done in the ER and he was found to be septic due to a UTI.  He was given IV fluids and IV antibiotics.  Subsequently it was discovered that he was actually on hospice at his facility.  Care was discussed with multiple family members and they ultimately agreed with comfort/hospice cares.  His facility was contacted and plan of care was discussed with his family, ultimately decision was made for him to be admitted to the hospital for end-of-life cares.    The patient's wife and his daughter were in the room with him today.  They confirmed desire for comfort cares.  They state he has been on hospice at the facility.  Overall his condition has been deteriorating, however he still does have some good times and was able to go out to a restaurant with his family yesterday.  They did note that he was more tired and fatigued than usual yesterday.  This morning he was very weak and tired and confused, was not speaking, and the symptoms blood to EMS being called to transport him into the ER for evaluation.    Past Medical History    Dementia  Hypertension  Hyperlipidemia  Coronary artery disease  Hypothyroidism  History of pulmonary embolism  History of subdural hematoma     Past Surgical History   No past surgical history on file.    Prior to Admission Medications   Prior to Admission Medications   Prescriptions Last Dose Informant Patient Reported? Taking?   LORazepam (ATIVAN) 0.5 MG tablet prn at prn Nursing Home Yes Yes   Sig: Take 0.5 mg by mouth every 4 hours as needed  for anxiety   acetaminophen (TYLENOL) 325 MG tablet 4/28/2023 at pm Nursing Home Yes Yes   Sig: Take 650 mg by mouth every 6 hours   amLODIPine (NORVASC) 10 MG tablet 4/28/2023 at am Nursing Home Yes Yes   Sig: Take 10 mg by mouth daily   aspirin (ASA) 81 MG chewable tablet 4/28/2023 at am Nursing Home Yes Yes   Sig: Take 81 mg by mouth daily   diclofenac (VOLTAREN) 1 % topical gel 4/28/2023 at pm Nursing Home Yes Yes   Sig: Apply 2 g topically 3 times daily   gabapentin (NEURONTIN) 300 MG capsule 4/28/2023 at pm Nursing Home Yes Yes   Sig: Take 300 mg by mouth 3 times daily   glucosamine-chondroitin 500-400 MG CAPS per capsule 4/28/2023 at am Nursing Home Yes Yes   Sig: Take 1 capsule by mouth daily   haloperidol (HALDOL) 1 MG tablet prn at prn Nursing Home Yes Yes   Sig: Take 1 mg by mouth every 4 hours as needed for agitation   hyoscyamine (LEVSIN/SL) 0.125 MG sublingual tablet prn at prn Nursing Home Yes Yes   Sig: Place 0.125 mg under the tongue every 4 hours as needed for cramping   levothyroxine (SYNTHROID/LEVOTHROID) 75 MCG tablet 4/28/2023 at am Nursing Home Yes Yes   Sig: Take 75 mcg by mouth daily   loperamide (IMODIUM A-D) 2 MG tablet prn at prn Nursing Home Yes Yes   Sig: Take 2 mg by mouth 4 times daily as needed for diarrhea   morphine 5 MG solu-tab prn at prn Nursing Home Yes Yes   Sig: Take 5 mg by mouth every 4 hours as needed for shortness of breath or moderate to severe pain   pantoprazole (PROTONIX) 40 MG EC tablet prn at prn Nursing Home Yes Yes   Sig: Take 1 packet by mouth daily as needed   senna-docusate (SENOKOT-S/PERICOLACE) 8.6-50 MG tablet prn at prn Nursing Home Yes Yes   Sig: Take 1 tablet by mouth 2 times daily as needed for constipation   tamsulosin (FLOMAX) 0.4 MG capsule 4/28/2023 at pm Nursing Home Yes Yes   Sig: Take 0.8 mg by mouth daily   urea (GORMEL) 20 % external cream 4/28/2023 at am Nursing Home Yes Yes   Sig: Apply topically daily      Facility-Administered Medications:  None        Review of Systems    Review of systems not obtained due to patient factors - mental status    Social History   I have reviewed this patient's social history and updated it with pertinent information if needed.       Allergies   No Known Allergies     Physical Exam   Vital Signs: Temp: (!) 102.1  F (38.9  C) Temp src: Rectal BP: (!) 163/81 Pulse: 95   Resp: 27 SpO2: 95 %      Weight: 160 lbs 14.97 oz    Constitutional: resting comfortably in the ER bed, no apparent distress  Respiratory: no increased work of breathing  Cardiovascular: regular rate and rhythm, normal S1 and S2, no murmur noted  GI: normal bowel sounds, soft, non-distended, non-tender  Skin: warm, dry  Musculoskeletal: no lower extremity pitting edema present  Neurologic: resting comfortably in the ER bed, I did not try to wake him up    Medical Decision Making       60 MINUTES SPENT BY ME on the date of service doing chart review, history, exam, documentation & further activities per the note.      Data     I have personally reviewed the following data over the past 24 hrs:    17.6 (H)  \   10.3 (L)   / 228     138 105 43.2 (H) /  132 (H)   4.5 19 (L) 1.47 (H) \       ALT: 19 AST: 27 AP: 193 (H) TBILI: 0.4   ALB: 3.6 TOT PROTEIN: 7.7 LIPASE: N/A       Trop: 77 (H) BNP: N/A       Procal: N/A CRP: N/A Lactic Acid: 1.4; 1.3         Imaging results reviewed over the past 24 hrs:   Recent Results (from the past 24 hour(s))   XR Chest 2 Views    Narrative    EXAM: XR CHEST 2 VIEWS  LOCATION: [facility]  DATE/TIME: 4/29/2023 9:17 AM CDT    INDICATION: fever, confused  COMPARISON: None.      Impression    IMPRESSION: No acute cardiopulmonary abnormality. Asymmetric elevation of the right hemidiaphragm. Calcified right hilar lymph nodes suggesting prior granulomatous infection. Degenerative changes in the spine.   CT Head w/o Contrast    Narrative    EXAM: CT HEAD W/O CONTRAST  LOCATION: [facility]  [facility]OS[facility]  DATE/TIME: 4/29/2023 9:31 AM CDT    INDICATION: altered, fever  COMPARISON: None.  TECHNIQUE: Routine CT Head without IV contrast. Multiplanar reformats. Dose reduction techniques were used.    FINDINGS:  INTRACRANIAL CONTENTS: No intracranial hemorrhage, extraaxial collection, or mass effect.  No CT evidence of acute infarct. 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 Moderate presumed chronic small vessel ischemic changes. Moderate generalized   volume loss. No hydrocephalus.     VISUALIZED ORBITS/SINUSES/MASTOIDS: Prior bilateral cataract surgery. Visualized portions of the orbits are otherwise unremarkable. No paranasal sinus mucosal disease. No middle ear or mastoid effusion.    BONES/SOFT TISSUES: No acute abnormality.      Impression    IMPRESSION:  1.  No CT evidence for acute intracranial process.  2.  Brain atrophy and presumed chronic microvascular ischemic changes as above.

## 2023-04-29 NOTE — PHARMACY-ADMISSION MEDICATION HISTORY
Pharmacy Intern Admission Medication History    Admission medication history is complete. The information provided in this note is only as accurate as the sources available at the time of the update.    Medication reconciliation/reorder completed by provider prior to medication history? No    Information Source(s): Facility ([facility]/) medication list/MAR via in-person and N/A      Changes made to PTA medication list:    Added: All medications on the list were added via [facility]    Deleted: None    Changed: None    Medication Affordability: Unable to assess     Allergies reviewed with patient and updates made in EHR: unable to assess    Medication History Completed By: Moncho Fenton 4/29/2023 10:38 AM    Prior to Admission medications    Medication Sig Last Dose Taking? Auth Provider Long Term End Date   acetaminophen (TYLENOL) 325 MG tablet Take 650 mg by mouth every 6 hours 4/28/2023 at pm Yes Unknown, Entered By History     amLODIPine (NORVASC) 10 MG tablet Take 10 mg by mouth daily 4/28/2023 at am Yes Unknown, Entered By History Yes    aspirin (ASA) 81 MG chewable tablet Take 81 mg by mouth daily 4/28/2023 at am Yes Unknown, Entered By History     diclofenac (VOLTAREN) 1 % topical gel Apply 2 g topically 3 times daily 4/28/2023 at pm Yes Unknown, Entered By History     gabapentin (NEURONTIN) 300 MG capsule Take 300 mg by mouth 3 times daily 4/28/2023 at pm Yes Unknown, Entered By History Yes    glucosamine-chondroitin 500-400 MG CAPS per capsule Take 1 capsule by mouth daily 4/28/2023 at am Yes Unknown, Entered By History     haloperidol (HALDOL) 1 MG tablet Take 1 mg by mouth every 4 hours as needed for agitation prn at prn Yes Unknown, Entered By History Yes    hyoscyamine (LEVSIN/SL) 0.125 MG sublingual tablet Place 0.125 mg under the tongue every 4 hours as needed for cramping prn at prn Yes Unknown, Entered By History     levothyroxine (SYNTHROID/LEVOTHROID) 75 MCG tablet Take 75 mcg  by mouth daily 4/28/2023 at am Yes Unknown, Entered By History Yes    loperamide (IMODIUM A-D) 2 MG tablet Take 2 mg by mouth 4 times daily as needed for diarrhea prn at prn Yes Unknown, Entered By History     LORazepam (ATIVAN) 0.5 MG tablet Take 0.5 mg by mouth every 4 hours as needed for anxiety prn at prn Yes Unknown, Entered By History     morphine 5 MG solu-tab Take 5 mg by mouth every 4 hours as needed for shortness of breath or moderate to severe pain prn at prn Yes Unknown, Entered By History     pantoprazole (PROTONIX) 40 MG EC tablet Take 1 packet by mouth daily as needed prn at prn Yes Unknown, Entered By History     senna-docusate (SENOKOT-S/PERICOLACE) 8.6-50 MG tablet Take 1 tablet by mouth 2 times daily as needed for constipation prn at prn Yes Unknown, Entered By History     tamsulosin (FLOMAX) 0.4 MG capsule Take 0.8 mg by mouth daily 4/28/2023 at pm Yes Unknown, Entered By History     urea (GORMEL) 20 % external cream Apply topically daily 4/28/2023 at am Yes Unknown, Entered By History

## 2023-04-29 NOTE — ED NOTES
0845 restraints for IVs and monitors needed, pt does not follow commands, restless, picking at lines, monitors, sheets clothing etc

## 2023-04-29 NOTE — ED NOTES
[facility]  ED Nurse Handoff Report    ED Chief complaint: Altered Mental Status      ED Diagnosis:   Final diagnoses:   None       Code Status: not addressed     Allergies: No Known Allergies    Patient Story: From behavior unit at assisted living, today nonverbal, confused, restless, pulling at things and picking in air, sat 84 room air, 98% on 4 liters, Temp 102, , tachy hypertensive, 250 NS given , here with wife             Focused Assessment:  Febrile, 500 ml in bladder, cloudy urine , less restless after garcia placed , sitter , bilat soft restraints for IV and monitors to stay in place   Treatments and/or interventions provided: NS, antibiotics , tylenol   Patient's response to treatments and/or interventions:       To be done/followed up on inpatient unit:  unknown     Does this patient have any cognitive concerns?: restless, non verbal today and yoko not follow commands     Activity level - Baseline/Home:  Unknown  Activity Level - Current:   Unknown    Patient's Preferred language: Data Unavailable   Needed?: No    Isolation: None  Infection: Not Applicable  Patient tested for COVID 19 prior to admission: YES  Bariatric?: No    Vital Signs:   Vitals:    04/29/23 0842 04/29/23 0849 04/29/23 0851   BP:  (!) 167/79    Pulse:  101    Resp:  22    Temp: (!) 102.1  F (38.9  C)     TempSrc: Rectal     SpO2:  94%    Weight:   73 kg (160 lb 15 oz)       Cardiac Rhythm:     Was the PSS-3 completed:   No -non verbal   What interventions are required if any?               Family Comments: wife here   OBS brochure/video discussed/provided to patient/family:              Name of person given brochure if not patient:               Relationship to patient:       For the majority of the shift this patient's behavior was   Yellow .   Behavioral interventions performed were sitter, wrist restraints .    ED NURSE PHONE NUMBER: [phone]

## 2023-04-29 NOTE — ED PROVIDER NOTES
History     Chief Complaint:  Altered Mental Status       The history is provided by the patient, the spouse and the EMS personnel. The history is limited by the condition of the patient.      Artem Rivera is a 89 year old male with a history of hypertension, hyperlipidemia, TAMI, and MI who presents with altered mental status and fever.  He is brought in by EMS who reports that the patient is typically verbal and able to move on his own. His wife stated that today he was unable to get up, he is unable to verbally communicate, and very confused.  He was noted to have a fever and EMS was called.  He has a history of dementia, but this is not typical confusion for him.  EMS reports that he is being treated for an infection of his left foot.  Patient is unable to provide any history due to his altered mental status.     Independent Historian:   EMS - They report the history provided today and Daughter - They report The patient is currently on hospice with goals of comfort care and they are not sure why he was brought into the hospital.  Family I placed him on hospice with hopes to avoid having him come to the hospital.  They would not want any aggressive life-saving measures remains or admission to the hospital for treatment.    Review of External Notes:     ROS:  Review of Systems   Unable to perform ROS: Patient nonverbal     Allergies:  The patient has no known allergies to medications.    Medications:    Levothyroxine  Protonix    Past Medical History:    Hypernatremia  Urinary retention  TAMI  Type 2 acute myocardial infarction  E. Coli bacteremia  Sepsis to escherichia coli  Dementia  Encephalopathy acute  Asymmetric sensorineural deafness  IPMN  Pancreatic mass  Hypertension  Skin cancer  Glaucoma  Hypothyroidism  Hoarseness  Ganglion  Hyperlipidemia    Past Surgical History:    Colonoscopy  Excision of nevus  Cyst removal  Angiogram  Right hip replacement    Family History:    The patient denies any prior  family history.    Social History:  Patient came from assisted living.  Patient is accompanied in the ED with spouse.  Patient arrived via EMS.  PCP: No primary care provider on file.     Physical Exam     Patient Vitals for the past 24 hrs:   BP Temp Temp src Pulse Resp SpO2 Weight   04/29/23 0900 (!) 152/71 -- -- 97 24 96 % --   04/29/23 0851 -- -- -- -- -- -- 73 kg (160 lb 15 oz)   04/29/23 0849 (!) 167/79 -- -- 101 22 94 % --   04/29/23 0846 (!) 150/70 -- -- 105 26 95 % --   04/29/23 0842 -- (!) 102.1  F (38.9  C) Rectal -- -- -- --        Physical Exam  Constitutional: Toxic appearing.  HEENT: Atraumatic.  PERRL.  EOMI.  Moist mucous membranes.  Neck: Soft.  Supple.  No JVD.  Cardiac: Tachycardic rate with a regular rhythm.  No murmur or rub.  Respiratory: Clear to auscultation bilaterally.  No respiratory distress.  No wheezing, rhonchi, or rales.  Abdomen: Soft and nontender.  No guarding.  Nondistended.  Musculoskeletal: No edema.  Normal range of motion.  Neurologic: Alert and quite confused.  Unable to answer questions.  Does not follow commands, looking around the room making unintelligible speech. Normal tone and bulk.   Normal gait.  Skin: No rashes.  No edema.  Psych: Encephalopathic.        Emergency Department Course   ECG  ECG obtained at 0853, ECG read at 0941  Atrial fibrillation  Nonspecific ST abnormality  Abnormal ECG  Rate 100 bpm. DE interval * ms. QRS duration 78 ms. QT/QTc 320/412 ms. P-R-T axes * 56 70.      Imaging:  CT Head w/o Contrast   Final Result   IMPRESSION:   1.  No CT evidence for acute intracranial process.   2.  Brain atrophy and presumed chronic microvascular ischemic changes as above.      XR Chest 2 Views   Final Result   IMPRESSION: No acute cardiopulmonary abnormality. Asymmetric elevation of the right hemidiaphragm. Calcified right hilar lymph nodes suggesting prior granulomatous infection. Degenerative changes in the spine.         Report per  radiology    Laboratory:  Labs Ordered and Resulted from Time of ED Arrival to Time of ED Departure   ROUTINE UA WITH MICROSCOPIC REFLEX TO CULTURE - Abnormal       Result Value    Color Urine Straw      Appearance Urine Slightly Cloudy (*)     Glucose Urine Negative      Bilirubin Urine Negative      Ketones Urine Negative      Specific Gravity Urine 1.012      Blood Urine Small (*)     pH Urine 5.5      Protein Albumin Urine 100 (*)     Urobilinogen Urine Normal      Nitrite Urine Positive (*)     Leukocyte Esterase Urine Large (*)     WBC Clumps Urine Present (*)     Mucus Urine Present (*)     RBC Urine 4 (*)     WBC Urine >182 (*)    CBC WITH PLATELETS AND DIFFERENTIAL - Abnormal    WBC Count 17.6 (*)     RBC Count 3.22 (*)     Hemoglobin 10.3 (*)     Hematocrit 31.5 (*)     MCV 98      MCH 32.0      MCHC 32.7      RDW 15.3 (*)     Platelet Count 228      % Neutrophils 87      % Lymphocytes 5      % Monocytes 7      % Eosinophils 0      % Basophils 0      % Immature Granulocytes 1      NRBCs per 100 WBC 0      Absolute Neutrophils 15.3 (*)     Absolute Lymphocytes 1.0      Absolute Monocytes 1.2      Absolute Eosinophils 0.0      Absolute Basophils 0.0      Absolute Immature Granulocytes 0.2      Absolute NRBCs 0.0     ISTAT GASES LACTATE VENOUS POCT - Abnormal    Lactic Acid POCT 1.3      Bicarbonate Venous POCT 20 (*)     O2 Sat, Venous POCT 54 (*)     pCO2V Venous POCT 37 (*)     pH Venous POCT 7.35      pO2 Venous POCT 30     LACTIC ACID WHOLE BLOOD - Normal    Lactic Acid 1.4     COMPREHENSIVE METABOLIC PANEL   TROPONIN T, HIGH SENSITIVITY   INFLUENZA A/B, RSV, & SARS-COV2 PCR   BLOOD CULTURE   BLOOD CULTURE   URINE CULTURE      Emergency Department Course & Assessments:     Interventions:  Medications   cefTRIAXone (ROCEPHIN) 2 g vial to attach to  ml bag for ADULTS or NS 50 ml bag for PEDS (2 g Intravenous $New Bag 4/29/23 4343)   vancomycin (VANCOCIN) 1,750 mg in 0.9% NaCl 500 mL intermittent  infusion (has no administration in time range)   0.9% sodium chloride BOLUS (0 mLs Intravenous Stopped 4/29/23 0941)   acetaminophen (TYLENOL) Suppository 650 mg (650 mg Rectal $Given 4/29/23 0940)        Assessments:  0825 I obtained the history noted above from the patient.  1103 I rechecked the patient and explained findings.  1345 I rechecked the patient and talked to the family to discuss plan of care.     Independent Interpretation (X-rays, CTs, rhythm strip):  Chest x-ray without acute disease on my read.    Consultations/Discussion of Management or Tests:  None      Social Determinants of Health affecting care:   None    Disposition:  Care of the patient was transferred to my colleague Dr. Lees, pending family and facility decision on ability to care for patient.    Impression & Plan      Medical Decision Making:  Artem Rivera is a 89-year-old man who presents with fever and altered mental status and tachycardia.  Wife is present and states that the patient's presentation is quite abnormal for him.  Septic work-up initiated.  UTI is present.  He was given IV Rocephin based on previous urine cultures through [facility].  Chest x-ray without acute disease.  He was given Tylenol.  He is resting more comfortably after fluids and Tylenol.  Blood cultures and urine cultures obtained.  Discussed with wife plan for admission.  She was hesitant and wanted to wait till family arrived and daughter arrived and states the patient is actually hospice and comfort care and does not sure why the patient was transferred here.  Unfortunately, the facility did not communicate to EMS that the patient is hospice and the wife was unable to communicate that with us due to her underlying memory issues.  No paperwork was sent along with EMS.  There is no documentation in the chart that he is on hospice.  There there were multiple discussions with all family members present including on the telephone as well as discussions with her  with his facility and with help with social work, we are attempting to see if his facility is able to care for him on end-of-life care.  Ultimately, family is all in agreement that the patient should be end-of-life care and does not want aggressive treatment nor does want to continue treating his sepsis, as that decision has already been made as he was placed on hospice previously with a goal to never transfer him to the hospital.  Family is currently awaiting final decision about if he can go to his facility or not.  If he is unable to return to work, he will need to be admitted here for continued end-of-life care and further case management for next steps.  He was signed out to my colleague, Dr. Lees, at the routine end of my shift with disposition pending ultimately family decision and facility and hospice his ability to care for him today.    Diagnosis:    ICD-10-CM    1. Urinary tract infection without hematuria, site unspecified  N39.0       2. Sepsis, due to unspecified organism, unspecified whether acute organ dysfunction present (H)  A41.9              Scribe Disclosure:  I, Ed Cummings, am serving as a scribe at 8:33 AM on 4/29/2023 to document services personally performed by Irwin Noel MD based on my observations and the provider's statements to me.   4/29/2023   Irwin Noel MD Salay, Nicholas J, MD  05/01/23 0929

## 2023-04-29 NOTE — ED PROVIDER NOTES
Patient signed out to me pending discharge to a facility or admission.  Family is requesting comfort care and no further medical care.  I was called back into the room has a family would like him to come into the hospital.   also called me letting me know that that was her decision.  Unclear as to length of life.  GIP consult was suggested from social work.  Spoke with the hospitalist regarding admission.     Dominik Lees MD  04/29/23 4456

## 2023-04-29 NOTE — CONSULTS
Care Management Initial Consult    General Information  Assessment completed with: Family,    Type of CM/SW Visit: Initial Assessment    Primary Care Provider verified and updated as needed:     Readmission within the last 30 days:           Advance Care Planning:            Communication Assessment  Patient's communication style: spoken language (English or Bilingual)             Cognitive  Cognitive/Neuro/Behavioral:                        Living Environment:   People in home: facility resident     Current living Arrangements: assisted living ([facility] in [place])      Able to return to prior arrangements:         Family/Social Support:  Care provided by: other (see comments) ([facility] staff)  Provides care for: no one, unable/limited ability to care for self  Marital Status:   Children, Wife          Description of Support System: Supportive, Involved    Support Assessment: Adequate family and caregiver support, Adequate social supports    Current Resources:   Patient receiving home care services:       Community Resources:    Equipment currently used at home:    Supplies currently used at home:      Employment/Financial:  Employment Status:          Financial Concerns:             Does the patient's insurance plan have a 3 day qualifying hospital stay waiver?    Lifestyle & Psychosocial Needs:  Social Determinants of Health     Tobacco Use: Not on file   Alcohol Use: Not on file   Financial Resource Strain: Not on file   Food Insecurity: Not on file   Transportation Needs: Not on file   Physical Activity: Not on file   Stress: Not on file   Social Connections: Not on file   Intimate Partner Violence: Not on file   Depression: Not on file   Housing Stability: Not on file       Functional Status:  Prior to admission patient needed assistance:              Mental Health Status:          Chemical Dependency Status:                Values/Beliefs:  Spiritual, Cultural Beliefs, Mandaeism Practices, Values  that affect care:                 Additional Information:    Pt was brought to the ED on 4/29/23 from his ALEJANDRO at [facility] in [place].  Pt is enrolled in hospice with [facility].  Aries called [facility] to verify reasons for pt coming to [facility]  Triage RN at [facility] (ph:  [phone]) stated that she did not realize pt was on hospice and was immediately apologetic.  RN stated that pt can return back to [facility] today and asked if any orders are generated to please fax them to:  [phone].  Aries called [facility] and confirmed that pt is currently enrolled and if he is not formally admitted into the hospital he will not be discharged from [facility].  Pt is now on 02 and Sw explained this to [facility].  [facility] is ordering 02 for pt and provided a 4 hour window of when this will be delivered to [facility], between now and 1900.  Aries called family to go over conversation with both [facility] and [facility].  Family inquired into whether pt could remain in the hospital for his final moments of life.  Aries explained that if a pt is not imminent then discharging back to [facility] is typically the process, however, if pt is close to passing then it may not be appropriate to transport him back to [facility].  Aries encouraged family to continue discussions with MD.  Sw will await further direction on plan for pt.  If pt needs a ride arranged it will need to be scheduled no earlier than 1900 to allow time for 02 to be delivered to [facility].  Pt will need a stretcher ride given his confusion, need for 02 and inability to self-manage, and restlessness/agitation.  Sw to continue to follow.    Keira Eddy, AIMEESW

## 2023-04-29 NOTE — ED TRIAGE NOTES
From behavior unit at assisted living, today nonverbal, confused, restless, pulling at things and picking in air, sat 84 room air, 98% on 4 liters, Temp 102, , tachy hypertensive, 250 NS given , here with wife

## 2023-04-30 NOTE — PLAN OF CARE
RECEIVING UNIT ED HANDOFF REVIEW    ED Nurse Handoff Report was reviewed by: Lynne Villalobos RN on April 29, 2023 at 8:36 PM

## 2023-04-30 NOTE — PLAN OF CARE
Goal Outcome Evaluation:    Summary:  Sepsis due to UTI; Comfort cares  DATE & TIME: 4/30/23 1580-4294  Cognitive Concerns/ Orientation : A&O to self, confused; hx dementia   BEHAVIOR & AGGRESSION TOOL COLOR: green/yellow  CIWA SCORE: NA   ABNL VS/O2: Comfort cares  MOBILITY: Not out of bed  PAIN MANAGMENT: PRN Morphine given x2, Ativan given x2, Haldol given x1  DIET: Regular, not eating, likes cold water/ ice chips  BOWEL/BLADDER: Garcia, no BM this shift.  ABNL LAB/BG: NA  DRAIN/DEVICES: PIV x2 saline locked; garcia  TELEMETRY RHYTHM: NA  SKIN: Intact  TESTS/PROCEDURES: None  D/C DAY/GOALS/PLACE: comfort care  OTHER IMPORTANT INFO: Sitter at bedside for pt restlessness and pulling at lines/drains.  Hospice consult ordered.

## 2023-04-30 NOTE — PROGRESS NOTES
[facility]    Hospitalist Progress Note    Brief Summary:  Artem Rivera is a 89 year old male admitted on 4/29/2023. He was sent into the ER for evaluation of altered mental status.  He was found to be septic due to a UTI and was started on IV fluids and IV antibiotics.  It was subsequently discovered that he was actually on hospice at his assisted living facility.  Plan of care was discussed with his facility and multiple family members.  Decision was made to admit the patient to the hospital for hospice/end-of-life cares.  Assessment & Plan        End-of-life cares    He is on comfort care protocol at this time, agree with that was on hospice at care facility     As needed medications available..    Discussed with family that the exact timing of when he will pass away is unclear, he may be able to go back to his facility with hospice     Sepsis secondary to UTI  E coli Bacteremia   UTI  Elevated troponin  Received IV fluids and doses of Rocephin and vancomycin in the ER.    Antibiotics and IV fluids discontinued, plan for comfort cares as noted above.  No further testing/work-up planned.     Dementia  Hypertension  Hyperlipidemia  Coronary artery disease  Hypothyroidism  History of pulmonary embolism  History of subdural hematoma    PTA medications discontinued.  Not currently on alert enough to take any medications.  If he does become more alert, could consider restarting PTA gabapentin to assist with pain control.           DVT Prophylaxis: Pneumatic Compression Devices  Code Status: No CPR- Do NOT Intubate    Disposition: Possibly in a day or 2    Clinton Restrepo MD, MD  Text Page  (7am - 6pm)    Interval History   Patient seen and examined in his room today, he was mostly unresponsive.  But remained comfortable        -Data reviewed today: I reviewed all new labs and imaging results over the last 24 hours. I personally reviewed no images or EKG's today.    Physical Exam      BP:  132/82 Pulse: 100   Resp: 22 SpO2: 94 %      Vitals:    04/29/23 0851   Weight: 73 kg (160 lb 15 oz)     Vital Signs with Ranges  Pulse:  [] 100  Resp:  [12-33] 22  BP: (132-163)/() 132/82  SpO2:  [94 %-97 %] 94 %  I/O last 3 completed shifts:  In: -   Out: 3100 [Urine:3100]    Constitutional: fatigued, unresponsive  Respiratory: Equal air entry bilateral  Cardiovascular: normal S1 and S2,  GI: No scars, normal bowel sounds, soft, non-distended, non-tender, no masses palpated, no hepatosplenomegally      Medications       - MEDICATION INSTRUCTIONS -   Does not apply See Admin Instructions     sodium chloride (PF)  3 mL Intracatheter Q8H       Data   Recent Labs   Lab 04/29/23  0844   WBC 17.6*   HGB 10.3*   MCV 98         POTASSIUM 4.5   CHLORIDE 105   CO2 19*   BUN 43.2*   CR 1.47*   ANIONGAP 14   LAILA 9.1   *   ALBUMIN 3.6   PROTTOTAL 7.7   BILITOTAL 0.4   ALKPHOS 193*   ALT 19   AST 27       No results found for this or any previous visit (from the past 24 hour(s)).

## 2023-05-01 PROBLEM — Z51.5 HOSPICE CARE: Status: ACTIVE | Noted: 2023-01-01

## 2023-05-01 NOTE — H&P
[facility]    History and Physical  Hospitalist       Date of Admission:  (Not on file)    Assessment & Plan   Artem Rivera is a 89 year old male admitted on 4/29/2023. He was sent into the ER for evaluation of altered mental status.  He was found to be septic due to a UTI and was started on IV fluids and IV antibiotics.  It was subsequently discovered that he was actually on hospice at his assisted living facility.  Plan of care was discussed with his facility and multiple family members.  Decision was made to admit the patient to the hospital for hospice/end-of-life cares.      Final Discharge Diagnosis and Hospital Course.      End-of-life cares    He is on comfort care protocol at this time, agree with that was on hospice at care facility     As needed medications available..    Discussed with family on admission that the exact timing of when he will pass away is unclear, he may be able to go back to his facility with hospice    General inpatient hospice consulted, they have evaluated him and recommend inpatient Hospice admission, he will be discharge to be readmitted as GIP hospice      Sepsis secondary to UTI  E coli Bacteremia   UTI  Elevated troponin  Received IV fluids and doses of Rocephin and vancomycin in the ER.    Antibiotics and IV fluids discontinued, plan for comfort cares as noted above.  No further testing/work-up planned.     Dementia  Hypertension  Hyperlipidemia  Coronary artery disease  Hypothyroidism  History of pulmonary embolism  History of subdural hematoma    PTA medications discontinued.  Not currently on alert enough to take any medications.  If he does become more alert, could consider restarting PTA gabapentin to assist with pain control.             DVT Prophylaxis: VTE Prophylaxis not needed   Code Status: Comfort Care    Disposition: Expected discharge TBD    Clinton Restrepo MD, MD    Primary Care Physician   Olga Obregon    Chief Complaint   For  hospice care     History is obtained from the patient and electronic health record    History of Present Illness   Artem Rivera is a 89 year old male admitted on 4/29/2023. He was sent into the ER for evaluation of altered mental status.  He was found to be septic due to a UTI and was started on IV fluids and IV antibiotics.  It was subsequently discovered that he was actually on hospice at his assisted living facility.  Plan of care was discussed with his facility and multiple family members.  Decision was made to admit the patient to the hospital for hospice/end-of-life cares.    Past Medical History    I have reviewed this patient's medical history and updated it with pertinent information if needed.   No past medical history on file.    Past Surgical History   I have reviewed this patient's surgical history and updated it with pertinent information if needed.  No past surgical history on file.    Prior to Admission Medications   Cannot display prior to admission medications because the patient has not been admitted in this contact.     Allergies   No Known Allergies    Social History   I have reviewed this patient's social history and updated it with pertinent information if needed. Artem Rivera      Family History   I have reviewed this patient's family history and updated it with pertinent information if needed.   No family history on file.    Review of Systems   Review of systems not obtained due to patient factors - confusion    Physical Exam                      Vital Signs with Ranges  Resp:  [16] 16  0 lbs 0 oz    Constitutional:minimally responsive   Eyes: Conjunctiva and pupils examined and normal.  Respiratory: Clear to auscultation bilaterally, no crackles or wheezing.  Cardiovascular: Regular rate and rhythm, normal S1 and S2, and no murmur noted.  GI: Soft, non-distended, non-tender, normal bowel sounds.  Lymph/Hematologic: No anterior cervical or supraclavicular adenopathy.  Skin: No  rashes, no cyanosis, no edema.  Musculoskeletal: No joint swelling, erythema or tenderness.  Neurologic: confused, but moving all 4 limbs .      Data   Data reviewed today:  I personally reviewed no images or EKG's today.  Recent Labs   Lab 04/29/23  0844   WBC 17.6*   HGB 10.3*   MCV 98         POTASSIUM 4.5   CHLORIDE 105   CO2 19*   BUN 43.2*   CR 1.47*   ANIONGAP 14   LAILA 9.1   *   ALBUMIN 3.6   PROTTOTAL 7.7   BILITOTAL 0.4   ALKPHOS 193*   ALT 19   AST 27       No results found for this or any previous visit (from the past 24 hour(s)).

## 2023-05-01 NOTE — PHARMACY-ADMISSION MEDICATION HISTORY
Admission medication history completed at [facility]. Please see Pharmacy - Admission Medication History note from 4/29/2023.

## 2023-05-01 NOTE — PLAN OF CARE
Goal Outcome Evaluation:      Plan of Care Reviewed With: patient, spouse    Overall Patient Progress: decliningOverall Patient Progress: declining    Discharge    Patient discharged to inpatient hospice   Care plan note  Summary:  Sepsis due to UTI; Comfort cares  DATE & TIME: 5/1/23 8753-1299  Cognitive Concerns/ Orientation : Disoriented x4   BEHAVIOR & AGGRESSION TOOL COLOR: green/yellow agitated and restless at times  CIWA SCORE: NA   ABNL VS/O2: Comfort cares  MOBILITY: bedrest   PAIN MANAGMENT: has been needing multiple doses of SL morphine for pain/restlessness.   DIET: Regular, not eating, likes cold water/ ice chips  BOWEL/BLADDER: Garcia, no BM this shift.  ABNL LAB/BG: NA  DRAIN/DEVICES: PIV x2 saline locked; garcia patent   TELEMETRY RHYTHM: NA  SKIN: Intact  TESTS/PROCEDURES: None  D/C DAY/GOALS/PLACE: comfort care  OTHER IMPORTANT INFO: Sitter at bedside for restlessness and pulling at lines/drains.  Received prn IV Haldol x1 and Ativan x3 (x1 sublingual tablet and x2 IV) Discharged/care transitioned from inpatient to [facility] inpatient hospice.

## 2023-05-01 NOTE — PROGRESS NOTES
Referral Received - [facility]       [facility] would like to thank you for the [facility] referral.    Referral received and initial insurance information sent to  Hospice intake for review.    We are determining your patient's eligibility with a medical director at this time.    Our plan is to visit your patient as soon as possible. We will connect with the primary care team shortly to collect more information on the patient's progression. Thank you for your patience.    1210: wife signed consents for [facility] hospice. RN will complete the admission shortly.    CAROLYN Mcclendon  [phone]  [facility]  Contact information available via [facility] Paging/Directory     Listed as Hospice [facility] in [facility]

## 2023-05-01 NOTE — PROGRESS NOTES
[facility]    Hospitalist Progress Note    Brief Summary:  Artem Rivera is a 89 year old male admitted on 4/29/2023. He was sent into the ER for evaluation of altered mental status.  He was found to be septic due to a UTI and was started on IV fluids and IV antibiotics.  It was subsequently discovered that he was actually on hospice at his assisted living facility.  Plan of care was discussed with his facility and multiple family members.  Decision was made to admit the patient to the hospital for hospice/end-of-life cares.  Assessment & Plan        End-of-life cares    He is on comfort care protocol at this time, agree with that was on hospice at care facility     As needed medications available..    Discussed with family on admission that the exact timing of when he will pass away is unclear, he may be able to go back to his facility with hospice    General inpatient hospice consulted, evaluation is pending at this time.     Sepsis secondary to UTI  E coli Bacteremia   UTI  Elevated troponin  Received IV fluids and doses of Rocephin and vancomycin in the ER.    Antibiotics and IV fluids discontinued, plan for comfort cares as noted above.  No further testing/work-up planned.     Dementia  Hypertension  Hyperlipidemia  Coronary artery disease  Hypothyroidism  History of pulmonary embolism  History of subdural hematoma    PTA medications discontinued.  Not currently on alert enough to take any medications.  If he does become more alert, could consider restarting PTA gabapentin to assist with pain control.           DVT Prophylaxis: Pneumatic Compression Devices  Code Status: No CPR- Do NOT Intubate    Disposition: Possibly in a day or 2    Discussed with the  and the nursing staff to give the patient.    Clinton Restrepo MD, MD  Text Page  (7am - 6pm)    Interval History   Patient slightly irritable this morning, otherwise remained stable.    No other significant event  overnight    -Data reviewed today: I reviewed all new labs and imaging results over the last 24 hours. I personally reviewed no images or EKG's today.    Physical Exam            Resp: 16        Vitals:    04/29/23 0851   Weight: 73 kg (160 lb 15 oz)     Vital Signs with Ranges  Resp:  [16] 16  I/O last 3 completed shifts:  In: -   Out: 1275 [Urine:1275]    Constitutional: fatigued, unresponsive  Respiratory: Equal air entry bilateral  Cardiovascular: normal S1 and S2,  GI: No scars, normal bowel sounds, soft, non-distended, non-tender, no masses palpated, no hepatosplenomegally      Medications       - MEDICATION INSTRUCTIONS -   Does not apply See Admin Instructions     sodium chloride (PF)  3 mL Intracatheter Q8H       Data   Recent Labs   Lab 04/29/23  0844   WBC 17.6*   HGB 10.3*   MCV 98         POTASSIUM 4.5   CHLORIDE 105   CO2 19*   BUN 43.2*   CR 1.47*   ANIONGAP 14   LAILA 9.1   *   ALBUMIN 3.6   PROTTOTAL 7.7   BILITOTAL 0.4   ALKPHOS 193*   ALT 19   AST 27       No results found for this or any previous visit (from the past 24 hour(s)).

## 2023-05-01 NOTE — CONSULTS
[facility]    Consult Note - [facility]    ______________________________________________________________________    [facility] 24/7 Contact Number: [phone]    - Providers: Please contact [facility] with changes in orders or clinical plan of care   - Nursing: Please contact [facility] with significant changes in patient condition    Hospice will notify the care team (including the hospitalist) to confirm date of inpatient hospice (GIP) admission.    New Epic encounter will not be created until hospice completes admission.   ______________________________________________________________________        Hospice Diagnosis:   Metabolic encephalopathy     Indication for Inpatient Hospice: Terminal agitation     Goals for Hospital Discharge:   Patient is not medically stable for transport.  If patients condition should stabilize, hospice will work with family on appropriate discharge plan.     Plan of Care Discussed with the Following:   - Nurse: Monica  - Charge Nurse  - Hospitalist/Rounding Provider: Dr. Restrepo     - Hospice Provider: Riaz Irizarry     Summary of Visit (includes assessment, medications and any new orders):     Schedule Ativan 1 mg IV every 6 hours for agitation;  1 mg IV every 2 hours PRN     Schedule Haldol 1 mg IV every 6 hours for agitation; 1 mg IV every 2 hours PRN    Please continue to utilize roxanol PRN  for pain     Patti Villalobos, RN  [facility] hospice

## 2023-05-01 NOTE — PLAN OF CARE
Summary:  Sepsis due to UTI; Comfort cares  DATE & TIME: 4/30/23 1900- 5/01/23 0799  Cognitive Concerns/ Orientation : A&O to self, confused; hx dementia   BEHAVIOR & AGGRESSION TOOL COLOR: green/yellow restless at times  CIWA SCORE: NA   ABNL VS/O2: Comfort cares  MOBILITY: Ax2 gb/walker  PAIN MANAGMENT: PRN IV Morphine given x2 for restlessness, calling out  DIET: Regular, not eating, likes cold water/ ice chips  BOWEL/BLADDER: Garcia, no BM this shift.  ABNL LAB/BG: NA  DRAIN/DEVICES: PIV x2 saline locked; garcia  TELEMETRY RHYTHM: NA  SKIN: Intact  TESTS/PROCEDURES: None  D/C DAY/GOALS/PLACE: comfort care  OTHER IMPORTANT INFO: Sitter at bedside for pt restlessness and pulling at lines/drains.  Hospice consult ordered.

## 2023-05-01 NOTE — PROGRESS NOTES
Care Management Follow Up    Length of Stay (days): 2    Expected Discharge Date: 05/01/2023     Concerns to be Addressed:       Patient plan of care discussed at interdisciplinary rounds: Yes    Anticipated Discharge Disposition:  To be determined.     Anticipated Discharge Services:    Anticipated Discharge DME:      Patient/family educated on Medicare website which has current facility and service quality ratings:    Education Provided on the Discharge Plan:    Patient/Family in Agreement with the Plan:      Referrals Placed by CM/SW:    Private pay costs discussed:     Additional Information:  Patient came from an [facility] with [facility] involved.  Currently, his bedside RN reports patient is restless and she has been unable to provide oral medication for symptom management due to patient not cooperating, thus IV route has been necessary.  In addition patient has a 1:1 due to restlessness and pulling at his IV line.  The [facility] is unable to accept patient back if he requires IV for symptom management or a 1:1.    It appears patient's current needs are not able to be met in the Community.  Writer spoke with CAROLYN Mcclendon on GIP team.  She anticipates the GIP consult will occur by later morning or early afternoon.   Reviewed with Dr Restrepo.  Will follow for results of GIP consult.    MAICO Craig

## 2023-05-01 NOTE — DISCHARGE SUMMARY
[facility]    Discharge Summary  Hospitalist    Date of Admission:  4/29/2023  Date of Discharge:  No discharge date for patient encounter.  Discharging Provider: Clitnon Restrepo MD, MD  Date of Service (when I saw the patient): 05/01/23    Discharge Diagnoses   Please refer below     History of Present Illness   Artem Rivera is an 89 year old male who presented with AMS    Hospital Course   Artem Rivera is a 89 year old male admitted on 4/29/2023. He was sent into the ER for evaluation of altered mental status.  He was found to be septic due to a UTI and was started on IV fluids and IV antibiotics.  It was subsequently discovered that he was actually on hospice at his assisted living facility.  Plan of care was discussed with his facility and multiple family members.  Decision was made to admit the patient to the hospital for hospice/end-of-life cares.      Final Discharge Diagnosis and Hospital Course.      End-of-life cares    He is on comfort care protocol at this time, agree with that was on hospice at care facility     As needed medications available..    Discussed with family on admission that the exact timing of when he will pass away is unclear, he may be able to go back to his facility with hospice    General inpatient hospice consulted, they have evaluated him and recommend inpatient Hospice admission, he will be discharge to be readmitted as GIP hospice      Sepsis secondary to UTI  E coli Bacteremia   UTI  Elevated troponin  Received IV fluids and doses of Rocephin and vancomycin in the ER.    Antibiotics and IV fluids discontinued, plan for comfort cares as noted above.  No further testing/work-up planned.     Dementia  Hypertension  Hyperlipidemia  Coronary artery disease  Hypothyroidism  History of pulmonary embolism  History of subdural hematoma    PTA medications discontinued.  Not currently on alert enough to take any medications.  If he does become more alert,  could consider restarting PTA gabapentin to assist with pain control.           DVT Prophylaxis: Pneumatic Compression Devices  Code Status: No CPR- Do NOT Intubate     Disposition: Possibly in a day or 2     Discussed with the  and the nursing staff to give the patient.     Clinton Restrepo MD, MD    Significant Results and Procedures       Pending Results   These results will be followed up by PCP  Unresulted Labs Ordered in the Past 30 Days of this Admission     Date and Time Order Name Status Description    4/29/2023  8:43 AM Blood Culture Peripheral Blood Preliminary     4/29/2023  8:43 AM Blood Culture Peripheral Blood Preliminary           Code Status   Comfort Care       Primary Care Physician   Olga Obregon    Physical Exam            Resp: 16        Vitals:    04/29/23 0851   Weight: 73 kg (160 lb 15 oz)     Vital Signs with Ranges  Resp:  [16] 16  I/O last 3 completed shifts:  In: -   Out: 900 [Urine:900]    Constitutional: confused, minimally responsive   Respiratory: No increased work of breathing, good air exchange, clear to auscultation bilaterally, no crackles or wheezing  Cardiovascular: Normal apical impulse, regular rate and rhythm, normal S1 and S2, no S3 or S4, and no murmur noted  GI: No scars, normal bowel sounds, soft, non-distended, non-tender, no masses palpated, no hepatosplenomegally    Discharge Disposition   Discharged to [facility]   Condition at discharge: Poor    Consultations This Hospital Stay   PHARMACY TO [facility] INPATIENT HOSPICE ADULT CONSULT  PHARMACY IP CONSULT    Time Spent on this Encounter   Clinton SALINAS MD, personally saw the patient today and spent greater than 30 minutes discharging this patient.    Discharge Orders   No discharge procedures on file.  Discharge Medications   Current Discharge Medication List      CONTINUE these medications which have NOT CHANGED    Details   acetaminophen (TYLENOL) 325 MG tablet Take 650 mg by mouth every 6 hours       amLODIPine (NORVASC) 10 MG tablet Take 10 mg by mouth daily      aspirin (ASA) 81 MG chewable tablet Take 81 mg by mouth daily      diclofenac (VOLTAREN) 1 % topical gel Apply 2 g topically 3 times daily      gabapentin (NEURONTIN) 300 MG capsule Take 300 mg by mouth 3 times daily      glucosamine-chondroitin 500-400 MG CAPS per capsule Take 1 capsule by mouth daily      haloperidol (HALDOL) 1 MG tablet Take 1 mg by mouth every 4 hours as needed for agitation      hyoscyamine (LEVSIN/SL) 0.125 MG sublingual tablet Place 0.125 mg under the tongue every 4 hours as needed for cramping      levothyroxine (SYNTHROID/LEVOTHROID) 75 MCG tablet Take 75 mcg by mouth daily      loperamide (IMODIUM A-D) 2 MG tablet Take 2 mg by mouth 4 times daily as needed for diarrhea      LORazepam (ATIVAN) 0.5 MG tablet Take 0.5 mg by mouth every 4 hours as needed for anxiety      morphine 5 MG solu-tab Take 5 mg by mouth every 4 hours as needed for shortness of breath or moderate to severe pain      pantoprazole (PROTONIX) 40 MG EC tablet Take 1 packet by mouth daily as needed      senna-docusate (SENOKOT-S/PERICOLACE) 8.6-50 MG tablet Take 1 tablet by mouth 2 times daily as needed for constipation      tamsulosin (FLOMAX) 0.4 MG capsule Take 0.8 mg by mouth daily      urea (GORMEL) 20 % external cream Apply topically daily           Allergies   No Known Allergies  Data   Most Recent 3 CBC's:Recent Labs   Lab Test 04/29/23  0844   WBC 17.6*   HGB 10.3*   MCV 98         Most Recent 3 BMP's:  Recent Labs   Lab Test 04/29/23  0844      POTASSIUM 4.5   CHLORIDE 105   CO2 19*   BUN 43.2*   CR 1.47*   ANIONGAP 14   LAILA 9.1   *     Most Recent 2 LFT's:  Recent Labs   Lab Test 04/29/23  0844   AST 27   ALT 19   ALKPHOS 193*   BILITOTAL 0.4     Most Recent INR's and Anticoagulation Dosing History:  Anticoagulation Dose History          View : No data to display.                    Most Recent 3 Troponin's:No lab results  found.  Most Recent Cholesterol Panel:No lab results found.  Most Recent 6 Bacteria Isolates From Any Culture (See EPIC Reports for Culture Details):No lab results found.  Most Recent TSH, T4 and A1c Labs:No lab results found.  Results for orders placed or performed during the hospital encounter of 04/29/23   XR Chest 2 Views    Narrative    EXAM: XR CHEST 2 VIEWS  LOCATION: [facility]  DATE/TIME: 4/29/2023 9:17 AM CDT    INDICATION: fever, confused  COMPARISON: None.      Impression    IMPRESSION: No acute cardiopulmonary abnormality. Asymmetric elevation of the right hemidiaphragm. Calcified right hilar lymph nodes suggesting prior granulomatous infection. Degenerative changes in the spine.   CT Head w/o Contrast    Narrative    EXAM: CT HEAD W/O CONTRAST  LOCATION: [facility]  DATE/TIME: 4/29/2023 9:31 AM CDT    INDICATION: altered, fever  COMPARISON: None.  TECHNIQUE: Routine CT Head without IV contrast. Multiplanar reformats. Dose reduction techniques were used.    FINDINGS:  INTRACRANIAL CONTENTS: No intracranial hemorrhage, extraaxial collection, or mass effect.  No CT evidence of acute infarct. 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 Moderate presumed chronic small vessel ischemic changes. Moderate generalized   volume loss. No hydrocephalus.     VISUALIZED ORBITS/SINUSES/MASTOIDS: Prior bilateral cataract surgery. Visualized portions of the orbits are otherwise unremarkable. No paranasal sinus mucosal disease. No middle ear or mastoid effusion.    BONES/SOFT TISSUES: No acute abnormality.      Impression    IMPRESSION:  1.  No CT evidence for acute intracranial process.  2.  Brain atrophy and presumed chronic microvascular ischemic changes as above.     Most Recent 3 CBC's:Recent Labs   Lab Test 04/29/23  0844   WBC 17.6*   HGB 10.3*   MCV 98        Most Recent 3 BMP's:Recent Labs   Lab Test 04/29/23  0844      POTASSIUM 4.5    CHLORIDE 105   CO2 19*   BUN 43.2*   CR 1.47*   ANIONGAP 14   LAILA 9.1   *     Most Recent 2 LFT's:Recent Labs   Lab Test 04/29/23  0844   AST 27   ALT 19   ALKPHOS 193*   BILITOTAL 0.4

## 2023-05-02 NOTE — PLAN OF CARE
Goal Outcome Evaluation:      Plan of Care Reviewed With: patient    Overall Patient Progress: decliningOverall Patient Progress: declining    Summary:  Sepsis due to UTI; Comfort cares  DATE & TIME: 5/2/23 9250-6604  Cognitive Concerns/ Orientation : confused, disoriented x4   BEHAVIOR & AGGRESSION TOOL COLOR: green this shift, restless at times  CIWA SCORE: NA   ABNL VS/O2: Comfort cares  MOBILITY: bedrest   PAIN MANAGMENT: nonverbal indicators of pain present, received prn SL morphine 10 mg x5.   DIET: Regular, poor appetite.   BOWEL/BLADDER: Buckley patent, no BM. Given dulcolax suppository x1 per family request.    ABNL LAB/BG: NA  DRAIN/DEVICES: PIV SL, Buckley patent   TELEMETRY RHYTHM: NA  SKIN: Intact  TESTS/PROCEDURES: None  D/C DAY/GOALS/PLACE: TBD, care facility unable to accept patient back if he requires IV for symptom management or a 1:1 sitter.    OTHER IMPORTANT INFO: care transitioned to inpatient hospice. Pt confused/lethargic, arouses to touch, sitter at bedside for restlessness and pulling at lines/drains. Has received prn Ativan IV x3.  Scheduled IV Haldol q6h discontinued, prn every hour still available.

## 2023-05-02 NOTE — PLAN OF CARE
Goal Outcome Evaluation:  Cognitive Concerns/ Orientation : confused, disoriented x4   BEHAVIOR & AGGRESSION TOOL COLOR: green this shift, restless at times  CIWA SCORE: NA   ABNL VS/O2: Comfort cares  MOBILITY: bedrest   PAIN MANAGMENT: appears to be comfortable   DIET: Regular, not eating took some ice chips  BOWEL/BLADDER: Buckley, Bisacodyl supp given, no BM given, UOP-400cc  ABNL LAB/BG: NA  DRAIN/DEVICES: PIV SL, Buckley patent   TELEMETRY RHYTHM: NA  SKIN: Intact  TESTS/PROCEDURES: None  D/C DAY/GOALS/PLACE: Comfort care  OTHER IMPORTANT INFO: Pt confused/lethargic, arouses to touch, cooperative with cares, sitter at bedside for restlessness and pulling at lines/drains. On scheduled Haldol q6h. Discharged/care transitioned from inpatient to [facility] inpatient hospice.

## 2023-05-02 NOTE — PROGRESS NOTES
[facility]    Hospitalist Progress Note    Brief Summary:  Artem Rivera is a 89 year old male admitted on 4/29/2023. He was sent into the ER for evaluation of altered mental status.  He was found to be septic due to a UTI and was started on IV fluids and IV antibiotics.  It was subsequently discovered that he was actually on hospice at his assisted living facility.  Plan of care was discussed with his facility and multiple family members.  Decision was made to admit the patient to the hospital for hospice/end-of-life cares.  Patient is now admitted under general inpatient hospice    Assessment & Plan        End-of-life cares    He is on comfort care protocol at this time, agree with that was on hospice at care facility     As needed medications available..    Discussed with family on admission that the exact timing of when he will pass away is unclear, he may be able to go back to his facility with hospice    General inpatient hospice consulted, and now admitted under general inpatient hospice.    Haldol IV scheduled as per hospice, patient is much comfortable, may change to as needed as well if not needed any further doses.    .  Requiring as needed Ativan occasionally.     Sepsis secondary to UTI  E coli Bacteremia   UTI  Elevated troponin  Received IV fluids and doses of Rocephin and vancomycin in the ER.    Antibiotics and IV fluids discontinued, plan for comfort cares as noted above.  No further testing/work-up planned.     Dementia  Hypertension  Hyperlipidemia  Coronary artery disease  Hypothyroidism  History of pulmonary embolism  History of subdural hematoma    PTA medications discontinued.  Not currently on alert enough to take any medications.  If he does become more alert, could consider restarting PTA gabapentin to assist with pain control.           DVT Prophylaxis: Pneumatic Compression Devices  Code Status: No CPR- Do NOT Intubate    Disposition: Possibly in a day or  2    Discussed with the  and the nursing staff to give the patient.    Clinton Restrepo MD, MD  Text Page  (7am - 6pm)    Interval History   Patient slightly irritable this morning, otherwise remained stable.    No other significant event overnight    -Data reviewed today: I reviewed all new labs and imaging results over the last 24 hours. I personally reviewed no images or EKG's today.    Physical Exam                      There were no vitals filed for this visit.  Vital Signs with Ranges     I/O last 3 completed shifts:  In: -   Out: 400 [Urine:400]    Constitutional: fatigued, unresponsive, Comfortable  Respiratory: Equal air entry bilateral  Cardiovascular: normal S1 and S2,  GI: No scars, normal bowel sounds, soft, non-distended, non-tender, no masses palpated, no hepatosplenomegally      Medications       - MEDICATION INSTRUCTIONS -   Does not apply See Admin Instructions     haloperidol lactate  1 mg Intravenous Q6H     sodium chloride (PF)  3 mL Intracatheter Q8H       Data   Recent Labs   Lab 04/29/23  0844   WBC 17.6*   HGB 10.3*   MCV 98         POTASSIUM 4.5   CHLORIDE 105   CO2 19*   BUN 43.2*   CR 1.47*   ANIONGAP 14   LAILA 9.1   *   ALBUMIN 3.6   PROTTOTAL 7.7   BILITOTAL 0.4   ALKPHOS 193*   ALT 19   AST 27       No results found for this or any previous visit (from the past 24 hour(s)).

## 2023-05-02 NOTE — PROGRESS NOTES
[facility]    Progress Note - [facility]    ______________________________________________________________________    [facility] 24/7 Contact Number: [phone]    - Providers: Please contact [facility] with changes in orders or clinical plan of care   - Nursing: Please contact [facility] with significant changes in patient condition  ______________________________________________________________________        Care Team:    - Hospitalist/Rounding Provider: Dr. Restrepo   - Patient's Family/Preferred Contact: Chelsea norman    Hospice Provider: Riaz Irizarry      Summary of Visit (includes assessment, medications and any new orders):      Schedule Ativan 1 mg IV every 6 hours for agitation;  1 mg IV every 2 hours PRN      Please continue to utilize roxanol 10 mg PO/SL at regular intervals for pain management (patient has required 5 PRN's x 24 hours)           Patti Villalobos RN  [facility]  Contact information available via [facility] Paging/Directory

## 2023-05-02 NOTE — PLAN OF CARE
Goal Outcome Evaluation:      Plan of Care Reviewed With: patient, family    Overall Patient Progress: decliningOverall Patient Progress: declining    Summary:  Sepsis due to UTI; Comfort cares  DATE & TIME: 5/1/23 0705-5933  Cognitive Concerns/ Orientation : Disoriented x4   BEHAVIOR & AGGRESSION TOOL COLOR: green/yellow agitated and restless at times  CIWA SCORE: NA   ABNL VS/O2: Comfort cares  MOBILITY: bedrest   PAIN MANAGMENT: nonverbal indicators of pain present, has received multiple doses of SL morphine.   DIET: Regular, not eating, likes cold water/ ice chips  BOWEL/BLADDER: Garcia, no BM this shift.  ABNL LAB/BG: NA  DRAIN/DEVICES: PIV x1 saline locked; garcia patent   TELEMETRY RHYTHM: NA  SKIN: Intact  TESTS/PROCEDURES: None  D/C DAY/GOALS/PLACE: comfort care  OTHER IMPORTANT INFO: Sitter at bedside for restlessness and pulling at lines/drains. On scheduled Haldol q6h and prn Ativan available for anxiety/agitation.  Discharged/care transitioned from inpatient to [facility] inpatient hospice.

## 2023-05-03 NOTE — PROGRESS NOTES
"Nutrition Admission Risk Screen Received -   Have you recently lost weight without trying ? - \"unsure\"  Have you been eating poorly due to a decreased appetite ?- \"yes\"    Pt is Hospice IP. Defer assessment.  RD available should POC change.     Zabrina Cantu RD, LD  [facility], Ortho, [facility]  Pager: [phone]  Weekend Pager: [phone]      "

## 2023-05-03 NOTE — PLAN OF CARE
Goal Outcome Evaluation:      Plan of Care Reviewed With: patient, family    Overall Patient Progress: decliningOverall Patient Progress: declining    Summary:  Sepsis due to UTI; Comfort cares  DATE & TIME: 5/3/23 1675-6975  Cognitive Concerns/ Orientation: confused, pt has hx of dementia. Lethargic this shift.   BEHAVIOR & AGGRESSION TOOL COLOR: green  ABNL VS/O2: Comfort cares, remains apneic   MOBILITY: bedrest   PAIN MANAGMENT: nonverbal indicators of pain absent.   DIET: Regular, poor appetite, no intake this shift. Frequent oral cares provided.   BOWEL/BLADDER: Buckley patent, low UOP, 150 last 8 hours, small BM x1 this shift.   DRAIN/DEVICES: PIV SL, Buckley patent   D/C DAY/GOALS/PLACE: TBD, care facility unable to accept patient back if he requires IV meds for symptom management and 1:1 sitter.    OTHER IMPORTANT INFO: 1:1 sitter discontinued, pt has been calm, mostly lethargic. Received prn IV Ativan x1 for restlessness/anxiety this morning, no prns given rest of the shift. Care transitioned to inpatient hospice on 5/1/23.

## 2023-05-03 NOTE — PROGRESS NOTES
"SPIRITUAL HEALTH SERVICES Progress Note  [facility]. Unit [address] medical specialties    Saw pt Artem Rivera per admission request.  Patient is GIP hospice; also being supported by Hospice chaplain Pelon who states most recent visit was 5/2/23.  Visit with wife Farnaz and daughter Janeth. Shared reading of Jan 6 and prayer at Janeth's request.    Patient/Family Understanding of Illness and Goals of Care - Janeth shared about \"Hub's\" transition from his memory care facility to the hospital for hospice care, that he hears from his left ear but has not been awake, communicating, and that he will show response by lifting his eyebrows and moving more or coughing.    Distress and Loss - Janeth became tearful as she shared how \"we've been ready for this for maybe 4 years, but it is still a surprise when the end comes.\"   Farnaz spoke of having been with Linden for 60 years, and that she struggles with memory, \"sometimes I need to be reminded.\"     Strengths, Coping, and Resources - Linden was a  for many years; Farnaz worked with [employer] where Linden would act in performances. They both enjoy music and [employer] hymns.    Meaning, Beliefs, and Spirituality - Matthew. Janeth states Linden's sagrario is \"central\" and that he will appreciate prayers, reading scripture, and singing of hymns.    Plan of Care - Unit  and hospice chaplain will coordinate support and continue to be available at pt/family request.    Agnieszka Ruano MDiv  Staff   [facility] routine referrals *[phone]  [facility] available 24/7 for emergent requests/referrals, either by having the on-call  paged or by entering an ASAP/STAT consult in Epic (this will also page the on-call ).    "

## 2023-05-03 NOTE — PROGRESS NOTES
[facility]    Progress Note - [facility]    ______________________________________________________________________    [facility] 24/7 Contact Number: [phone]    - Providers: Please contact [facility] with changes in orders or clinical plan of care   - Nursing: Please contact [facility] with significant changes in patient condition  ______________________________________________________________________        Plan of Care Discussed with the Following:   -name:  Monica   - Hospitalist/Rounding Provider: Dr. Kaye Mcgill's Family/Preferred Contact: Chelsea / Eloisa    Hospice Provider: Riaz Irizarry     Summary of Visit (includes assessment, medications and any new orders):     Patient obtunded, sitter has been discontinued.  Recommend continuing to utilize PRN lorazepam / haldol at regular intervals for underlying anxiety / agitation.  Please monitor RR and administer roxanol for RR >24 for management of underlying pain / dyspnea. Appreciate quality comfort care provided by station 66 staff.         Patti Villalobos RN

## 2023-05-03 NOTE — PROGRESS NOTES
[facility]    Hospitalist Progress Note    Brief Summary:  Artem Rivera is a 89 year old male admitted on 4/29/2023. He was sent into the ER for evaluation of altered mental status.  He was found to be septic due to a UTI and was started on IV fluids and IV antibiotics.  It was subsequently discovered that he was actually on hospice at his assisted living facility.  Plan of care was discussed with his facility and multiple family members.  Decision was made to admit the patient to the hospital for hospice/end-of-life cares.  Patient is now admitted under general inpatient hospice    Assessment & Plan        End-of-life cares    He is on comfort care protocol at this time, agree with that was on hospice at care facility     As needed medications available..    Discussed with family on admission that the exact timing of when he will pass away is unclear, he may be able to go back to his facility with hospice    General inpatient hospice consulted, and now admitted under general inpatient hospice.    Haldol IV scheduled as per hospice, patient is much comfortable,  change to as needed as well if not needed any further doses.    .  Requiring as needed Ativan occasionally.    Overall remains comfortable at this time.     Sepsis secondary to UTI  E coli Bacteremia   UTI  Elevated troponin  Received IV fluids and doses of Rocephin and vancomycin in the ER.    Antibiotics and IV fluids discontinued, as comfort cares as noted above.  No further testing/work-up planned.     Dementia  Hypertension  Hyperlipidemia  Coronary artery disease  Hypothyroidism  History of pulmonary embolism  History of subdural hematoma    PTA medications discontinued.  Not currently on alert enough to take any medications.  If he does become more alert, could consider restarting PTA gabapentin to assist with pain control.           DVT Prophylaxis: Pneumatic Compression Devices  Code Status: No CPR- Do NOT  Intubate      Discussed with the nursing staff during care the patient    Clinton Restrepo MD, MD  Text Page  (7am - 6pm)    Interval History   Patient sleeping comfortably at the time of my visit, no agitation or irritation at this time.  Remain minimally responsive.        -Data reviewed today: I reviewed all new labs and imaging results over the last 24 hours. I personally reviewed no images or EKG's today.    Physical Exam             Resp: 14        There were no vitals filed for this visit.  Vital Signs with Ranges  Resp:  [8-14] 14  I/O last 3 completed shifts:  In: -   Out: 600 [Urine:600]    Constitutional: Sleeping comfortably very calm, and unresponsive.  Respiratory: Equal air entry bilateral  Cardiovascular: normal S1 and S2,  GI: No scars, normal bowel sounds, soft, non-distended, non-tender, no masses palpated, no hepatosplenomegally      Medications       - MEDICATION INSTRUCTIONS -   Does not apply See Admin Instructions     sodium chloride (PF)  3 mL Intracatheter Q8H       Data   Recent Labs   Lab 04/29/23  0844   WBC 17.6*   HGB 10.3*   MCV 98         POTASSIUM 4.5   CHLORIDE 105   CO2 19*   BUN 43.2*   CR 1.47*   ANIONGAP 14   LAILA 9.1   *   ALBUMIN 3.6   PROTTOTAL 7.7   BILITOTAL 0.4   ALKPHOS 193*   ALT 19   AST 27       No results found for this or any previous visit (from the past 24 hour(s)).

## 2023-05-03 NOTE — PLAN OF CARE
Goal Outcome Evaluation:       Summary:  Sepsis due to UTI; Comfort cares  DATE & TIME: 5/2/23-5/3/23 5159-7632  Cognitive Concerns/ Orientation: LUX orientation, nonverbal. PRN Ativan given during the evening. Calm overnight, doesn't arouse with repositioning. 1:1 sitter discontinued at 0000.  BEHAVIOR & AGGRESSION TOOL COLOR: green  ABNL VS/O2: Comfort cares  MOBILITY: bedrest   PAIN MANAGMENT: nonverbal indicators of pain absent, received prn Lorazepam x1 yesterday evening.  DIET: Regular, poor appetite, no intake this shift.   BOWEL/BLADDER: Buckley patent, no BM. Had dulcolax suppository x1 yesterday 5/2 per family request.    DRAIN/DEVICES: PIV SL, Buckley patent   D/C DAY/GOALS/PLACE: TBD, care facility unable to accept patient back if he requires IV meds for symptom management or a 1:1 sitter.    OTHER IMPORTANT INFO: Care transitioned to inpatient hospice.

## 2023-05-04 NOTE — PLAN OF CARE
Goal Outcome Evaluation:                      Summary: Sepsis due to UTI; Comfort cares  DATE & TIME:5/4/23 1500  Cognitive Concerns/ Orientation:Pt not responding, keeps eyes closed, no verbalization. Pt does not respond to family either. Pt has hx of dementia. Sleeping/lethargic this shift. BEHAVIOR & AGGRESSION TOOL COLOR: green  ABNL VS/O2: Comfort cares.  MOBILITY: bedrest, turn q2 hrs  PAIN MANAGMENT: nonverbal indicators of pain absent.   DIET: Regular, no po intake this shift. Frequent oral cares provided.   BOWEL/BLADDER: Buckley patent, low output.   DRAIN/DEVICES: PIV SL, Buckley patent.   D/C DAY/GOALS/PLACE: TBD, care facility unable to accept patient back if he requires IV meds for symptom management.  OTHER IMPORTANT INFO: 1:1 sitter/long arm discontinued, pt has been calm, mostly sleeping/lethargic. No PRNs given this shift. Care transitioned to inpatient hospice on 5/1/23.

## 2023-05-04 NOTE — PROGRESS NOTES
SPIRITUAL HEALTH SERVICES  SPIRITUAL ASSESSMENT Progress Note  [facility]. [address] medical specialties     REFERRAL SOURCE: follow up    Hub was resting comfortably during this visit.  Provided emotional and spiritual support for his wife and prayer.    Plan: I will follow up next week. Spiritual Health remains available at patient's request for the duration of admission.    Agnieszka Ruano MDiv  Staff   [facility] routine referrals *[phone]  [facility] available 24/7 for emergent requests/referrals, either by having the on-call  paged or by entering an ASAP/STAT consult in Epic (this will also page the on-call ).

## 2023-05-04 NOTE — PROGRESS NOTES
[facility]    Progress Note - [facility]    ______________________________________________________________________    [facility] 24/7 Contact Number: [phone]    - Providers: Please contact [facility] with changes in orders or clinical plan of care   - Nursing: Please contact [facility] with significant changes in patient condition  ______________________________________________________________________        Plan of Care Discussed with the Following:   - Nurse: ARAVIND Hogue  - Hospitalist/Rounding Provider: Dr. Hawkins    - Patient's Family/Preferred Contact: Angelina-wife at bedside  - Hospice Provider: Dr. Riaz LACEY Eligibility: Terminal agitation    Pertinent assessment information:   Patient obtunded, wife at bedside praying and crying for the loss of Hub and of their 60 year marriage. Emotional support offered and given with much appreciation. Prayer offered and received from wife at bedside. She shares a strong [employer] sagrario with . Please continue to monitor RR and administer roxanol for RR >24 for management of underlying pain / dyspnea. Daughters and wife appreciate the quality care Hub is being provided.       Ce Sahu RN  [facility]  Contact information available via [facility] Paging/Directory

## 2023-05-04 NOTE — PROGRESS NOTES
[facility]    Hospitalist Progress Note    Brief Summary:  Artem Rivera is a 89 year old male admitted on 4/29/2023. He was sent into the ER for evaluation of altered mental status.  He was found to be septic due to a UTI and was started on IV fluids and IV antibiotics.  It was subsequently discovered that he was actually on hospice at his assisted living facility.  Plan of care was discussed with his facility and multiple family members.  Decision was made to admit the patient to the hospital for hospice/end-of-life cares.  Patient is now admitted under general inpatient hospice    Assessment & Plan        End-of-life cares    He is on comfort care protocol at this time, agree with that was on hospice at care facility     As needed medications available..    Discussed with family on admission that the exact timing of when he will pass away is unclear, he may be able to go back to his facility with hospice    General inpatient hospice consulted, and now admitted under general inpatient hospice.    Haldol IV scheduled as per hospice, patient is much comfortable,  change to as needed as well if not needed any further doses.    .  Requiring as needed Ativan occasionally.    Overall remains comfortable at this time.     Sepsis secondary to UTI  E coli Bacteremia   UTI  Elevated troponin  Received IV fluids and doses of Rocephin and vancomycin in the ER.    Antibiotics and IV fluids discontinued, as comfort cares as noted above.  No further testing/work-up planned.     Dementia  Hypertension  Hyperlipidemia  Coronary artery disease  Hypothyroidism  History of pulmonary embolism  History of subdural hematoma    PTA medications discontinued.  Not currently on alert enough to take any medications.  If he does become more alert, could consider restarting PTA gabapentin to assist with pain control.           DVT Prophylaxis: Pneumatic Compression Devices  Code Status: No CPR- Do NOT  Intubate      Discussed with the nursing staff during care the patient    Pili Hawkins MD, MD  Text Page  (7am - 6pm)    Interval History     Minimally responsive. Chart reviewed.        -Data reviewed today: I reviewed all new labs and imaging results over the last 24 hours. I personally reviewed no images or EKG's today.    Physical Exam             Resp: (!) 9        There were no vitals filed for this visit.  Vital Signs with Ranges  Resp:  [9-18] 9  I/O last 3 completed shifts:  In: 0   Out: 250 [Urine:250]    Constitutional: Sleeping comfortably very calm, and unresponsive.  Respiratory: Equal air entry bilateral  Cardiovascular: normal S1 and S2,  GI: No scars, normal bowel sounds, soft, non-distended, non-tender, no masses palpated, no hepatosplenomegally      Medications       - MEDICATION INSTRUCTIONS -   Does not apply See Admin Instructions     sodium chloride (PF)  3 mL Intracatheter Q8H       Data   Recent Labs   Lab 04/29/23  0844   WBC 17.6*   HGB 10.3*   MCV 98         POTASSIUM 4.5   CHLORIDE 105   CO2 19*   BUN 43.2*   CR 1.47*   ANIONGAP 14   LAILA 9.1   *   ALBUMIN 3.6   PROTTOTAL 7.7   BILITOTAL 0.4   ALKPHOS 193*   ALT 19   AST 27       No results found for this or any previous visit (from the past 24 hour(s)).

## 2023-05-04 NOTE — PROGRESS NOTES
Notified provider about indwelling garcia catheter discussed removal or continued need.    Did provider choose to remove indwelling garcia catheter? No    Provider's garcia indication for keeping indwelling garcia catheter: End of Life.    Is there an order for indwelling garcia catheter? Yes    *If there is a plan to keep garcia catheter in place at discharge daily notification with provider is not necessary, but please add a notation in the treatment team sticky note that the patient will be discharging with the catheter.

## 2023-05-04 NOTE — PLAN OF CARE
Goal Outcome Evaluation:       Summary: Sepsis due to UTI; Comfort cares  DATE & TIME: 5/3/23- 5/4/23 8970-5192  Cognitive Concerns/ Orientation: confused/disoriented x4, pt has hx of dementia. Sleeping/lethargic this shift. BEHAVIOR & AGGRESSION TOOL COLOR: green  ABNL VS/O2: Comfort cares, remains apneic while sleeping.   MOBILITY: bedrest   PAIN MANAGMENT: nonverbal indicators of pain absent.   DIET: Regular, poor appetite, no intake this shift. Frequent oral cares provided.   BOWEL/BLADDER: Buckley patent, low output, small BM x1 yesterday 5/3.   DRAIN/DEVICES: PIV SL, Buckley patent.   D/C DAY/GOALS/PLACE: TBD, care facility unable to accept patient back if he requires IV meds for symptom management.  OTHER IMPORTANT INFO: 1:1 sitter/long arm discontinued, pt has been calm, mostly sleeping/lethargic. No PRNs given this shift. Care transitioned to inpatient hospice on 5/1/23.

## 2023-05-05 NOTE — PROGRESS NOTES
"SPIRITUAL HEALTH SERVICES Progress Note  [facility] [address]    Referral Source: Unit staff & family after Pt's death    Provided emotional support to \"Hub's\" spouse, Farnaz, and daughter, Eloisa, shortly after Hub's death. Farnaz and Eloisa shared memories of Hub and requested prayer at the bedside. 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     [facility] remains available.    Cristin Baez MDiv  Chaplain Resident  Pager: [phone]     [facility] available 24/7 for emergent requests/referrals, either by having the on-call  paged or by entering an ASAP/STAT consult in Epic (this will also page the on-call ).   "

## 2023-05-05 NOTE — PROGRESS NOTES
House Officer Death Pronouncement    Called by nursing staff to pronounce Artem Rivera dead.    Physical Exam: Spontaneous respirations absent, Breath sounds absent, Carotid pulse absent, Heart sounds absent and Pupillary light reflex absent    Patient was pronounced dead at 1505: , May 5, 2023.    No data filed       Principal Problem:    Hospice care   complicated cystitis w/ e coli bacteremia and sepsis  Dementia  Hypertension  Hyperlipidemia  Coronary artery disease  Hypothyroidism  History of pulmonary embolism  History of subdural hematoma    Infectious disease present?: YES    Communicable disease present? (examples: HIV, chicken pox, TB, Ebola, CJD) :  NO    Multi-drug resistant organism present? (example: MRSA): YES    Please consider an autopsy if any of the following exist:  NO Unexpected or unexplained death during or following any dental, medical, or surgical diagnostic treatment procedures.   NO Death of mother at or up to seven days after delivery.     NO All  and pediatric deaths.     NO Death where the cause is sufficiently obscure to delay completion of the death certificate.   NO Deaths in which autopsy would confirm a suspected illness/condition that would affect surviving family members or recipients of transplanted organs.     The following deaths must be reported to the 's Office:  NO A death that may be due entirely or in part to any factors other than natural disease (recent surgery, recent trauma, suspected abuse/neglect).   NO A death that may be an accident, suicide, or homicide.     NO Any sudden, unexpected death in which there is no prior history of significant heart disease or any other condition associated with sudden death.   NO A death under suspicious, unusual, or unexpected circumstances.    NO Any death which is apparently due to natural causes but in which the  does not have a personal physician familiar with the patient s medical history, social, or  environmental situation or the circumstances of the terminal event.   NO Any death apparently due to Sudden Infant Death Syndrome.     NO Deaths that occur during, in association with, or as consequences of a diagnostic, therapeutic, or anesthetic procedure.   NO Any death in which a fracture of a major bone has occurred within the past (6) six months.   NO A death of persons note seen by their physician within 120 days of demise.     NO Any death in which the  was an inmate of a public institution or was in the custody of Law Enforcement personnel.   NO  All unexpected deaths of children   NO Solid organ donors   NO Unidentified bodies   NO Deaths of persons whose bodies are to be cremated or otherwise disposed of so that the bodies will later be unavailable for examination;   NO Deaths unattended by a physician outside of a licensed healthcare facility or licensed residential hospice program   NO Deaths occurring within 24 hours of arrival to a health care facility if death is unexpected.    NO Deaths associated with the decedent s employment.   NO Deaths attributed to acts of terrorism.   NO Any death in which there is uncertainty as to whether it is a medical examiner s care should be discussed with the medical investigator.      Death Certificate to be directed to Dr. Pili Hawkins  Attending physician, Dr. Pili Hawkins, notified of death.    Body disposition: Autopsy was discussed with family member:  Spouse and Daughter in person.  Permission for autopsy was declined.    [facility]EXX [facility]  Hospitalist Service  [facility]  Securely message with [employer] (more info)  Text page via [facility] [facility] Paging/Directory

## 2023-05-05 NOTE — PROGRESS NOTES
[facility]    Hospitalist Progress Note    Brief Summary:  Artem Rivera is a 89 year old male admitted on 4/29/2023. He was sent into the ER for evaluation of altered mental status.  He was found to be septic due to a UTI and was started on IV fluids and IV antibiotics.  It was subsequently discovered that he was actually on hospice at his assisted living facility.  Plan of care was discussed with his facility and multiple family members.  Decision was made to admit the patient to the hospital for hospice/end-of-life cares.  Patient is now admitted under general inpatient hospice    Assessment & Plan        End-of-life cares    He is on comfort care protocol at this time, agree with that was on hospice at care facility     As needed medications available..    Discussed with family on admission that the exact timing of when he will pass away is unclear, he may be able to go back to his facility with hospice    General inpatient hospice consulted, and now admitted under general inpatient hospice.    Haldol IV scheduled as per hospice, patient is much comfortable,  change to as needed as well if not needed any further doses.    .  Requiring as needed Ativan occasionally.    Overall remains comfortable at this time.     Sepsis secondary to UTI  E coli Bacteremia   UTI  Elevated troponin  Received IV fluids and doses of Rocephin and vancomycin in the ER.    Antibiotics and IV fluids discontinued, as comfort cares as noted above.  No further testing/work-up planned.     Dementia  Hypertension  Hyperlipidemia  Coronary artery disease  Hypothyroidism  History of pulmonary embolism  History of subdural hematoma    PTA medications discontinued.  Not currently on alert enough to take any medications.  If he does become more alert, could consider restarting PTA gabapentin to assist with pain control.           DVT Prophylaxis: Pneumatic Compression Devices  Code Status: No CPR- Do NOT  Intubate      Discussed with the nursing staff during care the patient    Pili Hawkins MD, MD  Text Page  (7am - 6pm)    Interval History     Minimally responsive. Chart reviewed.        -Data reviewed today: I reviewed all new labs and imaging results over the last 24 hours. I personally reviewed no images or EKG's today.    Physical Exam   Temp: (!) 101.1  F (38.4  C) Temp src: Axillary               There were no vitals filed for this visit.  Vital Signs with Ranges  Temp:  [101.1  F (38.4  C)] 101.1  F (38.4  C)  No intake/output data recorded.    Constitutional: Sleeping comfortably very calm, and unresponsive.  Respiratory: Equal air entry bilateral  Cardiovascular: normal S1 and S2,  GI: No scars, normal bowel sounds, soft, non-distended, non-tender, no masses palpated, no hepatosplenomegally      Medications       - MEDICATION INSTRUCTIONS -   Does not apply See Admin Instructions     sodium chloride (PF)  3 mL Intracatheter Q8H       Data   Recent Labs   Lab 04/29/23  0844   WBC 17.6*   HGB 10.3*   MCV 98         POTASSIUM 4.5   CHLORIDE 105   CO2 19*   BUN 43.2*   CR 1.47*   ANIONGAP 14   LAILA 9.1   *   ALBUMIN 3.6   PROTTOTAL 7.7   BILITOTAL 0.4   ALKPHOS 193*   ALT 19   AST 27       No results found for this or any previous visit (from the past 24 hour(s)).

## 2023-05-05 NOTE — PROGRESS NOTES
Patient took final breath at 1505 on 5/5/23. Patient belongings sent home with family. Patient wedding ring remained on his finger when the body was brought to the [facility].

## 2023-05-05 NOTE — CONSULTS
[facility]    Consult Note - [facility]    ______________________________________________________________________    [facility] 24/7 Contact Number: [phone]    - Providers: Please contact [facility] with changes in orders or clinical plan of care   - Nursing: Please contact [facility] with significant changes in patient condition    Hospice will notify the care team (including the hospitalist) to confirm date of inpatient hospice (GIP) admission.    New Epic encounter will not be created until hospice completes admission.   ______________________________________________________________________        Hospice Diagnosis: sepsis r/t UTI, metabolic encephalopathy    Indication for Inpatient Hospice: terminal agitation    Goals for Hospital Discharge: not stable for discharge    Plan of Care Discussed with the Following:   - Nurse: ARAVIND Islas  - Hospitalist/Rounding Provider: Dr. levi Mcgill's Family/Preferred Contact: wife, Farnaz  - Hospice Provider: Dr. Riaz Irizarry    Summary of Visit (includes assessment, medications and any new orders):   Educated family on signs of imminent death.      Veronika Barr

## 2023-05-05 NOTE — PLAN OF CARE
Goal Outcome Evaluation:  Summary: Sepsis due to UTI; Comfort cares  DATE & TIME:5/4/23-5/5/23 3795-7584  Cognitive Concerns/ Orientation:Pt not responding, keeps eyes closed, no verbalization. Pt does not respond to family either. Pt has hx of dementia. Sleeping/lethargic this shift.   BEHAVIOR & AGGRESSION TOOL COLOR: green  ABNL VS/O2: Comfort cares.  MOBILITY: bedrest, turn q2 hrs  PAIN MANAGMENT: nonverbal indicators of pain absent this shift.   DIET: Regular, no po intake this shift.   BOWEL/BLADDER: Buckley patent, low output.   DRAIN/DEVICES: PIV SL, Buckley patent.   D/C DAY/GOALS/PLACE: TBD, care facility unable to accept patient back if he requires IV meds for symptom management.  OTHER IMPORTANT INFO: 1:1 sitter/long arm discontinued, pt has been calm, mostly sleeping/lethargic. No PRNs given this shift. Care transitioned to inpatient hospice on 5/1/23.  Wife at bedside, spend the night in pt's room.

## 2023-05-08 NOTE — DISCHARGE SUMMARY
Death Summary    Artem Rivera MRN# [id number]   YOB: 1929 Age: 89 year old     Date of Admission:  2023  Date of Discharge:  2023  6:48 PM  Admitting Physician:  Clinton Restrepo MD  Discharge Physician:  Pili Hawkins MD  Discharging Service:  Hospitalist     Home clinic:   Primary Provider: Olga Obregon          Admission Diagnoses:   Hospice care [Z51.5]          Discharge Diagnosis:   Patient Active Problem List   Diagnosis     Urinary tract infection without hematuria, site unspecified     Sepsis, due to unspecified organism, unspecified whether acute organ dysfunction present (H)     Hospice care patient     Hospice care                Discharge Disposition:   Patient  during this admission           Condition on Discharge:   Discharge condition:    Discharge vitals: Temperature (!) 101.1  F (38.4  C), temperature source Axillary, resp. rate (!) 9.     Code status on discharge: DNR / DNI           Procedures / Labs / Imaging:   No procedures performed during this admission          Medications Prior to Admission:     No medications prior to admission.             Discharge Medications:     No current facility-administered medications for this encounter.     Current Outpatient Medications   Medication Sig     acetaminophen (TYLENOL) 325 MG tablet Take 650 mg by mouth every 6 hours     amLODIPine (NORVASC) 10 MG tablet Take 10 mg by mouth daily     aspirin (ASA) 81 MG chewable tablet Take 81 mg by mouth daily     diclofenac (VOLTAREN) 1 % topical gel Apply 2 g topically 3 times daily     gabapentin (NEURONTIN) 300 MG capsule Take 300 mg by mouth 3 times daily     glucosamine-chondroitin 500-400 MG CAPS per capsule Take 1 capsule by mouth daily     haloperidol (HALDOL) 1 MG tablet Take 1 mg by mouth every 4 hours as needed for agitation     hyoscyamine (LEVSIN/SL) 0.125 MG sublingual tablet Place 0.125 mg under the tongue every 4 hours as needed for cramping      · Pt was off her home paxil since surgery  This was restarted 12/30    Her anxiety is stable at this time levothyroxine (SYNTHROID/LEVOTHROID) 75 MCG tablet Take 75 mcg by mouth daily     loperamide (IMODIUM A-D) 2 MG tablet Take 2 mg by mouth 4 times daily as needed for diarrhea     LORazepam (ATIVAN) 0.5 MG tablet Take 0.5 mg by mouth every 4 hours as needed for anxiety     morphine 5 MG solu-tab Take 5 mg by mouth every 4 hours as needed for shortness of breath or moderate to severe pain     pantoprazole (PROTONIX) 40 MG EC tablet Take 1 packet by mouth daily as needed     senna-docusate (SENOKOT-S/PERICOLACE) 8.6-50 MG tablet Take 1 tablet by mouth 2 times daily as needed for constipation     tamsulosin (FLOMAX) 0.4 MG capsule Take 0.8 mg by mouth daily     urea (GORMEL) 20 % external cream Apply topically daily             Consultations:   General inpatient hospice             Brief History of Illness:   Artem Rivera is a 89 year old male admitted on 4/29/2023. He was sent into the ER for evaluation of altered mental status.  He was found to be septic due to a UTI and was started on IV fluids and IV antibiotics.  It was subsequently discovered that he was actually on hospice at his assisted living facility.  Plan of care was discussed with his facility and multiple family members.  Decision was made to admit the patient to the hospital for hospice/end-of-life cares.  Patient is now admitted under general inpatient hospice          Hospital Course:   End-of-life cares    He is on comfort care protocol at this time, agree with that was on hospice at care facility     As needed medications available..    Discussed with family on admission that the exact timing of when he will pass away is unclear, he may be able to go back to his facility with hospice    General inpatient hospice consulted, and now admitted under general inpatient hospice.    Haldol IV scheduled as per hospice, patient is much comfortable,  change to as needed as well if not needed any further doses.    .  Requiring as needed Ativan  occasionally.     Patient was comfortable and received hospice care  He passed away peacefully in hospital     Sepsis secondary to UTI  E coli Bacteremia   UTI  Elevated troponin  Received IV fluids and doses of Rocephin and vancomycin in the ER.    Antibiotics and IV fluids discontinued, as comfort cares as noted above.  No further testing/work-up planned.     Dementia  Hypertension  Hyperlipidemia  Coronary artery disease  Hypothyroidism  History of pulmonary embolism  History of subdural hematoma    PTA medications discontinued.  Not currently on alert enough to take any medications.  If he does become more alert, could consider restarting PTA gabapentin to assist with pain control.                  Significant Results:   None             Pending Results:   None